# Patient Record
Sex: FEMALE | Race: BLACK OR AFRICAN AMERICAN | Employment: FULL TIME | URBAN - METROPOLITAN AREA
[De-identification: names, ages, dates, MRNs, and addresses within clinical notes are randomized per-mention and may not be internally consistent; named-entity substitution may affect disease eponyms.]

---

## 2017-12-08 ENCOUNTER — HOSPITAL ENCOUNTER (OUTPATIENT)
Dept: LAB | Age: 37
Discharge: HOME | End: 2017-12-08
Payer: COMMERCIAL

## 2017-12-08 LAB
ALBUMIN SERPL-MCNC: 2.8 G/DL (ref 3.4–5)
ALBUMIN/GLOB SERPL: 0.6 {RATIO} (ref 0.8–1.7)
ALP SERPL-CCNC: 82 U/L (ref 45–117)
ALT SERPL-CCNC: 20 U/L (ref 13–56)
ANION GAP SERPL CALC-SCNC: 6 MMOL/L (ref 3–18)
AST SERPL-CCNC: 13 U/L (ref 15–37)
BASOPHILS # BLD: 0 K/UL (ref 0–0.06)
BASOPHILS NFR BLD: 0 % (ref 0–2)
BILIRUB SERPL-MCNC: 0.3 MG/DL (ref 0.2–1)
BUN SERPL-MCNC: 15 MG/DL (ref 7–18)
BUN/CREAT SERPL: 27 (ref 12–20)
CALCIUM SERPL-MCNC: 8.4 MG/DL (ref 8.5–10.1)
CHLORIDE SERPL-SCNC: 107 MMOL/L (ref 100–108)
CHOLEST SERPL-MCNC: 126 MG/DL
CO2 SERPL-SCNC: 27 MMOL/L (ref 21–32)
CREAT SERPL-MCNC: 0.56 MG/DL (ref 0.6–1.3)
DIFFERENTIAL METHOD BLD: ABNORMAL
EOSINOPHIL # BLD: 0.2 K/UL (ref 0–0.4)
EOSINOPHIL NFR BLD: 3 % (ref 0–5)
ERYTHROCYTE [DISTWIDTH] IN BLOOD BY AUTOMATED COUNT: 14.2 % (ref 11.6–14.5)
GLOBULIN SER CALC-MCNC: 4.9 G/DL (ref 2–4)
GLUCOSE SERPL-MCNC: 83 MG/DL (ref 74–99)
HCT VFR BLD AUTO: 36.7 % (ref 35–45)
HDLC SERPL-MCNC: 47 MG/DL (ref 40–60)
HDLC SERPL: 2.7 {RATIO} (ref 0–5)
HGB BLD-MCNC: 11.9 G/DL (ref 12–16)
LDLC SERPL CALC-MCNC: 72.2 MG/DL (ref 0–100)
LIPID PROFILE,FLP: NORMAL
LYMPHOCYTES # BLD: 1.2 K/UL (ref 0.9–3.6)
LYMPHOCYTES NFR BLD: 16 % (ref 21–52)
MCH RBC QN AUTO: 27.2 PG (ref 24–34)
MCHC RBC AUTO-ENTMCNC: 32.4 G/DL (ref 31–37)
MCV RBC AUTO: 83.8 FL (ref 74–97)
MONOCYTES # BLD: 0.7 K/UL (ref 0.05–1.2)
MONOCYTES NFR BLD: 9 % (ref 3–10)
NEUTS SEG # BLD: 5.8 K/UL (ref 1.8–8)
NEUTS SEG NFR BLD: 72 % (ref 40–73)
PLATELET # BLD AUTO: 318 K/UL (ref 135–420)
PMV BLD AUTO: 9.7 FL (ref 9.2–11.8)
POTASSIUM SERPL-SCNC: 4.3 MMOL/L (ref 3.5–5.5)
PROT SERPL-MCNC: 7.7 G/DL (ref 6.4–8.2)
RBC # BLD AUTO: 4.38 M/UL (ref 4.2–5.3)
SODIUM SERPL-SCNC: 140 MMOL/L (ref 136–145)
TRIGL SERPL-MCNC: 34 MG/DL (ref ?–150)
VLDLC SERPL CALC-MCNC: 6.8 MG/DL
WBC # BLD AUTO: 7.9 K/UL (ref 4.6–13.2)

## 2017-12-08 PROCEDURE — 80061 LIPID PANEL: CPT | Performed by: INTERNAL MEDICINE

## 2017-12-08 PROCEDURE — 36415 COLL VENOUS BLD VENIPUNCTURE: CPT | Performed by: INTERNAL MEDICINE

## 2017-12-08 PROCEDURE — 80053 COMPREHEN METABOLIC PANEL: CPT | Performed by: INTERNAL MEDICINE

## 2017-12-08 PROCEDURE — 85025 COMPLETE CBC W/AUTO DIFF WBC: CPT | Performed by: INTERNAL MEDICINE

## 2018-10-20 ENCOUNTER — APPOINTMENT (OUTPATIENT)
Dept: GENERAL RADIOLOGY | Age: 38
End: 2018-10-20
Attending: PHYSICIAN ASSISTANT
Payer: COMMERCIAL

## 2018-10-20 ENCOUNTER — HOSPITAL ENCOUNTER (EMERGENCY)
Age: 38
Discharge: HOME | End: 2018-10-20
Attending: EMERGENCY MEDICINE
Payer: COMMERCIAL

## 2018-10-20 VITALS
HEART RATE: 77 BPM | TEMPERATURE: 98.2 F | HEIGHT: 69 IN | WEIGHT: 293 LBS | DIASTOLIC BLOOD PRESSURE: 69 MMHG | RESPIRATION RATE: 18 BRPM | BODY MASS INDEX: 43.4 KG/M2 | SYSTOLIC BLOOD PRESSURE: 115 MMHG | OXYGEN SATURATION: 100 %

## 2018-10-20 DIAGNOSIS — E87.6 HYPOKALEMIA: ICD-10-CM

## 2018-10-20 DIAGNOSIS — R10.13 DYSPEPSIA: ICD-10-CM

## 2018-10-20 DIAGNOSIS — K21.9 GASTROESOPHAGEAL REFLUX DISEASE, ESOPHAGITIS PRESENCE NOT SPECIFIED: Primary | ICD-10-CM

## 2018-10-20 LAB
ALBUMIN SERPL-MCNC: 3.1 G/DL (ref 3.4–5)
ALBUMIN/GLOB SERPL: 0.5 {RATIO} (ref 0.8–1.7)
ALP SERPL-CCNC: 84 U/L (ref 45–117)
ALT SERPL-CCNC: 24 U/L (ref 13–56)
ANION GAP SERPL CALC-SCNC: 11 MMOL/L (ref 3–18)
APPEARANCE UR: CLEAR
AST SERPL-CCNC: 18 U/L (ref 15–37)
BACTERIA URNS QL MICRO: ABNORMAL /HPF
BASOPHILS # BLD: 0 K/UL (ref 0–0.1)
BASOPHILS NFR BLD: 0 % (ref 0–2)
BILIRUB SERPL-MCNC: 0.3 MG/DL (ref 0.2–1)
BILIRUB UR QL: NEGATIVE
BUN SERPL-MCNC: 12 MG/DL (ref 7–18)
BUN/CREAT SERPL: 16 (ref 12–20)
CALCIUM SERPL-MCNC: 8.7 MG/DL (ref 8.5–10.1)
CHLORIDE SERPL-SCNC: 103 MMOL/L (ref 100–108)
CK MB CFR SERPL CALC: NORMAL % (ref 0–4)
CK MB SERPL-MCNC: <1 NG/ML (ref 5–25)
CK SERPL-CCNC: 62 U/L (ref 26–192)
CO2 SERPL-SCNC: 25 MMOL/L (ref 21–32)
COLOR UR: YELLOW
CREAT SERPL-MCNC: 0.76 MG/DL (ref 0.6–1.3)
DIFFERENTIAL METHOD BLD: ABNORMAL
EOSINOPHIL # BLD: 0.1 K/UL (ref 0–0.4)
EOSINOPHIL NFR BLD: 1 % (ref 0–5)
EPITH CASTS URNS QL MICRO: ABNORMAL /LPF (ref 0–5)
ERYTHROCYTE [DISTWIDTH] IN BLOOD BY AUTOMATED COUNT: 14.2 % (ref 11.6–14.5)
GLOBULIN SER CALC-MCNC: 5.8 G/DL (ref 2–4)
GLUCOSE SERPL-MCNC: 97 MG/DL (ref 74–99)
GLUCOSE UR STRIP.AUTO-MCNC: NEGATIVE MG/DL
HCG UR QL: NEGATIVE
HCT VFR BLD AUTO: 39.8 % (ref 35–45)
HGB BLD-MCNC: 12.8 G/DL (ref 12–16)
HGB UR QL STRIP: NEGATIVE
KETONES UR QL STRIP.AUTO: NEGATIVE MG/DL
LEUKOCYTE ESTERASE UR QL STRIP.AUTO: ABNORMAL
LIPASE SERPL-CCNC: 101 U/L (ref 73–393)
LYMPHOCYTES # BLD: 1.9 K/UL (ref 0.9–3.6)
LYMPHOCYTES NFR BLD: 23 % (ref 21–52)
MCH RBC QN AUTO: 26.7 PG (ref 24–34)
MCHC RBC AUTO-ENTMCNC: 32.2 G/DL (ref 31–37)
MCV RBC AUTO: 82.9 FL (ref 74–97)
MONOCYTES # BLD: 0.7 K/UL (ref 0.05–1.2)
MONOCYTES NFR BLD: 9 % (ref 3–10)
MUCOUS THREADS URNS QL MICRO: ABNORMAL /LPF
NEUTS SEG # BLD: 5.5 K/UL (ref 1.8–8)
NEUTS SEG NFR BLD: 67 % (ref 40–73)
NITRITE UR QL STRIP.AUTO: NEGATIVE
PH UR STRIP: 5.5 [PH] (ref 5–8)
PLATELET # BLD AUTO: 431 K/UL (ref 135–420)
PMV BLD AUTO: 9.7 FL (ref 9.2–11.8)
POTASSIUM SERPL-SCNC: 3.1 MMOL/L (ref 3.5–5.5)
PROT SERPL-MCNC: 8.9 G/DL (ref 6.4–8.2)
PROT UR STRIP-MCNC: NEGATIVE MG/DL
RBC # BLD AUTO: 4.8 M/UL (ref 4.2–5.3)
RBC #/AREA URNS HPF: ABNORMAL /HPF (ref 0–5)
SODIUM SERPL-SCNC: 139 MMOL/L (ref 136–145)
SP GR UR REFRACTOMETRY: 1.02 (ref 1–1.03)
TROPONIN I SERPL-MCNC: <0.02 NG/ML (ref 0–0.06)
TROPONIN I SERPL-MCNC: <0.02 NG/ML (ref 0–0.06)
UROBILINOGEN UR QL STRIP.AUTO: 1 EU/DL (ref 0.2–1)
WBC # BLD AUTO: 8.2 K/UL (ref 4.6–13.2)
WBC URNS QL MICRO: ABNORMAL /HPF (ref 0–4)

## 2018-10-20 PROCEDURE — 96376 TX/PRO/DX INJ SAME DRUG ADON: CPT

## 2018-10-20 PROCEDURE — 80053 COMPREHEN METABOLIC PANEL: CPT | Performed by: PHYSICIAN ASSISTANT

## 2018-10-20 PROCEDURE — 71046 X-RAY EXAM CHEST 2 VIEWS: CPT

## 2018-10-20 PROCEDURE — 93005 ELECTROCARDIOGRAM TRACING: CPT

## 2018-10-20 PROCEDURE — 96375 TX/PRO/DX INJ NEW DRUG ADDON: CPT

## 2018-10-20 PROCEDURE — 81025 URINE PREGNANCY TEST: CPT | Performed by: PHYSICIAN ASSISTANT

## 2018-10-20 PROCEDURE — 99284 EMERGENCY DEPT VISIT MOD MDM: CPT

## 2018-10-20 PROCEDURE — 74011250636 HC RX REV CODE- 250/636: Performed by: PHYSICIAN ASSISTANT

## 2018-10-20 PROCEDURE — 85025 COMPLETE CBC W/AUTO DIFF WBC: CPT | Performed by: PHYSICIAN ASSISTANT

## 2018-10-20 PROCEDURE — 96374 THER/PROPH/DIAG INJ IV PUSH: CPT

## 2018-10-20 PROCEDURE — 81001 URINALYSIS AUTO W/SCOPE: CPT | Performed by: PHYSICIAN ASSISTANT

## 2018-10-20 PROCEDURE — 82550 ASSAY OF CK (CPK): CPT | Performed by: PHYSICIAN ASSISTANT

## 2018-10-20 PROCEDURE — 83690 ASSAY OF LIPASE: CPT | Performed by: PHYSICIAN ASSISTANT

## 2018-10-20 RX ORDER — ONDANSETRON HYDROCHLORIDE 4 MG/2ML
4 INJECTION, SOLUTION INTRAMUSCULAR; INTRAVENOUS
Status: COMPLETED | OUTPATIENT
Start: 2018-10-20 | End: 2018-10-20

## 2018-10-20 RX ORDER — POTASSIUM CHLORIDE 1500 MG/1
20 TABLET, FILM COATED, EXTENDED RELEASE ORAL 2 TIMES DAILY
Qty: 6 TAB | Refills: 0 | Status: SHIPPED | OUTPATIENT
Start: 2018-10-20 | End: 2021-08-09

## 2018-10-20 RX ORDER — FAMOTIDINE 10 MG/ML
20 INJECTION INTRAVENOUS
Status: COMPLETED | OUTPATIENT
Start: 2018-10-20 | End: 2018-10-20

## 2018-10-20 RX ORDER — FAMOTIDINE 20 MG/1
20 TABLET, FILM COATED ORAL 2 TIMES DAILY
Qty: 20 TAB | Refills: 0 | Status: SHIPPED | OUTPATIENT
Start: 2018-10-20 | End: 2018-10-30

## 2018-10-20 RX ADMIN — FAMOTIDINE 20 MG: 10 INJECTION, SOLUTION INTRAVENOUS at 13:21

## 2018-10-20 RX ADMIN — ONDANSETRON HYDROCHLORIDE 4 MG: 4 INJECTION, SOLUTION INTRAMUSCULAR; INTRAVENOUS at 13:21

## 2018-10-20 RX ADMIN — FAMOTIDINE 20 MG: 10 INJECTION, SOLUTION INTRAVENOUS at 17:00

## 2018-10-20 NOTE — ED TRIAGE NOTES
Pt reports onset of chest pain on Tuesday; states left arm pain since Wednesday. States h/o acid reflux but had misplaced her medication x 1 week but has since relocated med (Protonix) & restarted it. Also took Zantac yesterday. Pt states she has been experiencing increased belching & flatulence lately.

## 2018-10-20 NOTE — ED PROVIDER NOTES
EMERGENCY DEPARTMENT HISTORY AND PHYSICAL EXAM 
 
Date: 10/20/2018 Patient Name: Nhung Chaparro History of Presenting Illness Chief Complaint Patient presents with  Chest Pain  Arm Pain History Provided By: patient Chief Complaint: chest pain Duration: few days Timing:acute Location: mid sternal 
Quality burning Severity: moderate Modifying Factors: none Associated Symptoms: epigastric burning, reflux, belching, L shoulder pain Additional History (Context): Nhung Chaparro is a 41 y.o. female with PMH GERD, fatty liver, htn, depression, and gallstones who presents with complaints of belching, epigastric burning, chest pain, reflux, and now L shoulder pain x a few days. Pt states she ran out of her reflux meds which she believed triggered her sx. States she just recently started this medication again. Denies fever, chills, and SOB. No other complaints. PCP: Brigid Olivia MD 
 
Current Outpatient Medications Medication Sig Dispense Refill  meloxicam (MOBIC PO) Take  by mouth daily as needed.  pantoprazole (PROTONIX) 40 mg tablet Take 1 Tab by mouth daily. 30 Tab 1  
 medroxyPROGESTERone (DEPO-PROVERA) 150 mg/mL injection 150 mg by IntraMUSCular route once.  sertraline (ZOLOFT) 50 mg tablet Take 50 mg by mouth daily. Past History Past Medical History: 
Past Medical History:  
Diagnosis Date  Anxiety  Anxiety  Depression  Elevated transaminase level 2016  Fatty infiltration of liver 2016  Gallstones  Gastroesophageal reflux disease without esophagitis 2016  GERD (gastroesophageal reflux disease)  HTN (hypertension) 2016  
 pt denies  Morbid obesity due to excess calories ([address] 2016 Past Surgical History: 
Past Surgical History:  
Procedure Laterality Date   DELIVERY ONLY    
 LAP,CHOLECYSTECTOMY  16 Dr. Sloane Saldana Family History: History reviewed. No pertinent family history. Social History: 
Social History Tobacco Use  Smoking status: Never Smoker  Smokeless tobacco: Never Used Substance Use Topics  Alcohol use: No  
 Drug use: No  
 
 
Allergies: Allergies Allergen Reactions  Other Food Hives and Swelling [address]  Bactrim [Sulfamethoprim Ds] Hives  Penicillin G Nausea and Vomiting Review of Systems Review of Systems Constitutional: Negative. Negative for chills and fever. HENT: Negative for congestion, ear pain and rhinorrhea. Eyes: Negative. Negative for pain and redness. Respiratory: Negative. Negative for cough, shortness of breath, wheezing and stridor. Cardiovascular: Positive for chest pain. Negative for leg swelling. Gastrointestinal: Positive for abdominal pain. Negative for constipation, diarrhea, nausea and vomiting. Belching, reflux Genitourinary: Negative. Negative for dysuria and frequency. Musculoskeletal: Negative. Negative for back pain and neck pain. Skin: Negative. Negative for rash and wound. Neurological: Negative. Negative for dizziness, seizures, syncope and headaches. All other systems reviewed and are negative. All Other Systems Negative Physical Exam  
 
Vitals:  
 10/20/18 1214 10/20/18 1324 10/20/18 1324 10/20/18 1515 BP: 144/85  101/71 126/78 Pulse: (!) 101 88  78 Resp: 16 15  18 Temp: 98.3 °F (36.8 °C) SpO2: 100% 99%  100% Weight: 149.7 kg (330 lb) Height: 5' 9\" (1.753 m) Physical Exam  
Constitutional: She is oriented to person, place, and time. She appears well-developed and well-nourished. No distress. HENT:  
Head: Normocephalic and atraumatic. Eyes: Conjunctivae are normal. Right eye exhibits no discharge. Left eye exhibits no discharge. No scleral icterus. Neck: Normal range of motion. Neck supple. Cardiovascular: Normal rate, regular rhythm and normal heart sounds.  Exam reveals no gallop and no friction rub. No murmur heard. Pulmonary/Chest: Effort normal and breath sounds normal. No stridor. No respiratory distress. She has no wheezes. She has no rales. She exhibits no tenderness. Abdominal: Soft. Bowel sounds are normal. She exhibits no distension. There is no tenderness. There is no guarding. Pt belching repeatedly during the exam.   
Musculoskeletal: Normal range of motion. Neurological: She is alert and oriented to person, place, and time. Coordination normal.  
Gait is steady. Able to ambulate without difficulty. Skin: Skin is warm and dry. No rash noted. She is not diaphoretic. No erythema. Psychiatric: She has a normal mood and affect. Her behavior is normal. Thought content normal.  
Nursing note and vitals reviewed. Diagnostic Study Results Labs - Recent Results (from the past 12 hour(s)) EKG, 12 LEAD, INITIAL Collection Time: 10/20/18 12:02 PM  
Result Value Ref Range Ventricular Rate 103 BPM  
 Atrial Rate 103 BPM  
 P-R Interval 168 ms QRS Duration 100 ms Q-T Interval 354 ms QTC Calculation (Bezet) 463 ms Calculated P Axis 55 degrees Calculated R Axis -23 degrees Calculated T Axis 20 degrees Diagnosis Sinus tachycardia Nonspecific ST and T wave abnormality Abnormal ECG When compared with ECG of 27-JUN-2016 09:30, 
T wave inversion now evident in Anterior leads CBC WITH AUTOMATED DIFF Collection Time: 10/20/18 12:07 PM  
Result Value Ref Range WBC 8.2 4.6 - 13.2 K/uL  
 RBC 4.80 4.20 - 5.30 M/uL  
 HGB 12.8 12.0 - 16.0 g/dL HCT 39.8 35.0 - 45.0 % MCV 82.9 74.0 - 97.0 FL  
 MCH 26.7 24.0 - 34.0 PG  
 MCHC 32.2 31.0 - 37.0 g/dL  
 RDW 14.2 11.6 - 14.5 % PLATELET 096 (H) 749 - 420 K/uL MPV 9.7 9.2 - 11.8 FL  
 NEUTROPHILS 67 40 - 73 % LYMPHOCYTES 23 21 - 52 % MONOCYTES 9 3 - 10 % EOSINOPHILS 1 0 - 5 % BASOPHILS 0 0 - 2 %  
 ABS. NEUTROPHILS 5.5 1.8 - 8.0 K/UL ABS. LYMPHOCYTES 1.9 0.9 - 3.6 K/UL  
 ABS. MONOCYTES 0.7 0.05 - 1.2 K/UL  
 ABS. EOSINOPHILS 0.1 0.0 - 0.4 K/UL  
 ABS. BASOPHILS 0.0 0.0 - 0.1 K/UL  
 DF AUTOMATED METABOLIC PANEL, COMPREHENSIVE Collection Time: 10/20/18 12:07 PM  
Result Value Ref Range Sodium 139 136 - 145 mmol/L Potassium 3.1 (L) 3.5 - 5.5 mmol/L Chloride 103 100 - 108 mmol/L  
 CO2 25 21 - 32 mmol/L Anion gap 11 3.0 - 18 mmol/L Glucose 97 74 - 99 mg/dL BUN 12 7.0 - 18 MG/DL Creatinine 0.76 0.6 - 1.3 MG/DL  
 BUN/Creatinine ratio 16 12 - 20 GFR est AA >60 >60 ml/min/1.73m2 GFR est non-AA >60 >60 ml/min/1.73m2 Calcium 8.7 8.5 - 10.1 MG/DL Bilirubin, total 0.3 0.2 - 1.0 MG/DL  
 ALT (SGPT) 24 13 - 56 U/L  
 AST (SGOT) 18 15 - 37 U/L Alk. phosphatase 84 45 - 117 U/L Protein, total 8.9 (H) 6.4 - 8.2 g/dL Albumin 3.1 (L) 3.4 - 5.0 g/dL Globulin 5.8 (H) 2.0 - 4.0 g/dL A-G Ratio 0.5 (L) 0.8 - 1.7 LIPASE Collection Time: 10/20/18 12:07 PM  
Result Value Ref Range Lipase 101 73 - 393 U/L  
CARDIAC PANEL,(CK, CKMB & TROPONIN) Collection Time: 10/20/18 12:07 PM  
Result Value Ref Range CK 62 26 - 192 U/L  
 CK - MB <1.0 <3.6 ng/ml CK-MB Index  0.0 - 4.0 % CALCULATION NOT PERFORMED WHEN RESULT IS BELOW LINEAR LIMIT Troponin-I, Qt. <0.02 0.00 - 0.06 NG/ML  
URINALYSIS W/ RFLX MICROSCOPIC Collection Time: 10/20/18 12:57 PM  
Result Value Ref Range Color YELLOW Appearance CLEAR Specific gravity 1.024 1.005 - 1.030    
 pH (UA) 5.5 5.0 - 8.0 Protein NEGATIVE  NEG mg/dL Glucose NEGATIVE  NEG mg/dL Ketone NEGATIVE  NEG mg/dL Bilirubin NEGATIVE  NEG Blood NEGATIVE  NEG Urobilinogen 1.0 0.2 - 1.0 EU/dL Nitrites NEGATIVE  NEG Leukocyte Esterase TRACE (A) NEG    
HCG URINE, QL Collection Time: 10/20/18 12:57 PM  
Result Value Ref Range HCG urine, QL NEGATIVE  NEG    
URINE MICROSCOPIC ONLY  Collection Time: 10/20/18 12:57 PM  
 Result Value Ref Range WBC 0 to 3 0 - 4 /hpf  
 RBC NONE 0 - 5 /hpf Epithelial cells 3+ 0 - 5 /lpf Bacteria 1+ (A) NEG /hpf Mucus 2+ (A) NEG /lpf  
TROPONIN I Collection Time: 10/20/18  3:55 PM  
Result Value Ref Range Troponin-I, Qt. <0.02 0.00 - 0.06 NG/ML Radiologic Studies -  
XR CHEST PA LAT Final Result  
  
no acute process or definite infiltrates noted CT Results  (Last 48 hours) None CXR Results  (Last 48 hours) 10/20/18 1245  XR CHEST PA LAT Final result Impression:  IMPRESSION:  
   
Negative study. Narrative:  EXAM:  Chest Frontal and Lateral.  
   
INDICATION:  Chest pain COMPARISON:  11/12/11 TECHNIQUE:  PA and lateral views. FINDINGS:   
   
- Both lungs are clear.   
- No pleural effusion or pneumothorax is detected. - The cardiac silhouette, mediastinum and hilar regions are unremarkable. - No significant interval changes are identified. Medical Decision Making I am the first provider for this patient. I reviewed the vital signs, available nursing notes, past medical history, past surgical history, family history and social history. Vital Signs-Reviewed the patient's vital signs. Records Reviewed: Rosario Granger PA-C 1:34 PM  
 
Procedures: 
Procedures Provider Notes (Medical Decision Making): Impression:  GERD, dyspepsia Repeat cardiac enzymes and EKG unchanged. pepcid and zofran alleviated the sx. Pt counseled about dietary modifications to include eliminating caffeinated beverages, as well as spicy and acidic foods. MED RECONCILIATION: 
No current facility-administered medications for this encounter. Current Outpatient Medications Medication Sig  
 meloxicam (MOBIC PO) Take  by mouth daily as needed.  pantoprazole (PROTONIX) 40 mg tablet Take 1 Tab by mouth daily.   
 medroxyPROGESTERone (DEPO-PROVERA) 150 mg/mL injection 150 mg by IntraMUSCular route once.  sertraline (ZOLOFT) 50 mg tablet Take 50 mg by mouth daily. Disposition: D/c 
 
DISCHARGE NOTE:  
 
Pt has been reexamined. Patient has no new complaints, changes, or physical findings. Care plan outlined and precautions discussed. Results of the labs and imaging were reviewed with the patient. All medications were reviewed with the patient; will d/c home with pepcid. All of pt's questions and concerns were addressed. Patient was instructed and agrees to follow up with PCP, as well as to return to the ED upon further deterioration. Patient is ready to go home. Rosario Granger PA-C 4:46 PM  
 
 
Diagnosis Clinical Impression:dyspepsia, chest pain unspec

## 2018-10-20 NOTE — ED NOTES
I have reviewed discharge instructions with the patient. The patient verbalized understanding. Patient armband removed and shredded Current Discharge Medication List  
  
START taking these medications Details  
famotidine (PEPCID) 20 mg tablet Take 1 Tab by mouth two (2) times a day for 10 days. Qty: 20 Tab, Refills: 0  
  
potassium chloride SR (K-TAB) 20 mEq tablet Take 1 Tab by mouth two (2) times a day. Qty: 6 Tab, Refills: 0 STOP taking these medications  
  
 pantoprazole (PROTONIX) 40 mg tablet Comments:  
Reason for Stopping:

## 2018-10-20 NOTE — DISCHARGE INSTRUCTIONS
Indigestion (Dyspepsia or Heartburn): Care Instructions  Your Care Instructions  Sometimes it can be hard to pinpoint the cause of indigestion. (It is also called dyspepsia or heartburn.) Most cases of an upset stomach with bloating, burning, burping, and nausea are minor and go away within several hours. Home treatment and over-the-counter medicine often are able to control symptoms. But if you take medicine to relieve your indigestion without making diet and lifestyle changes, your symptoms are likely to return again and again. If you get indigestion often, it may be a sign of a more serious medical problem. Be sure to follow up with your doctor, who may want to do tests to be sure of the cause of your indigestion. Follow-up care is a key part of your treatment and safety. Be sure to make and go to all appointments, and call your doctor if you are having problems. It's also a good idea to know your test results and keep a list of the medicines you take. How can you care for yourself at home? · Your doctor may recommend over-the-counter medicine. For mild or occasional indigestion, antacids such as Gaviscon, Mylanta, Maalox, or Tums, may help. Be safe with medicines. Be careful when you take over-the-counter antacid medicines. Many of these medicines have aspirin in them. Read the label to make sure that you are not taking more than the recommended dose. Too much aspirin can be harmful. · Your doctor also may recommend over-the-counter acid reducers, such as Pepcid AC, Tagamet HB, Zantac 75, or Prilosec. Read and follow all instructions on the label. If you use these medicines often, talk with your doctor. · Change your eating habits. ? It's best to eat several small meals instead of two or three large meals. ? After you eat, wait 2 to 3 hours before you lie down. ? Chocolate, mint, and alcohol can make GERD worse. ?  Spicy foods, foods that have a lot of acid (like tomatoes and oranges), and coffee can make GERD symptoms worse in some people. If your symptoms are worse after you eat a certain food, you may want to stop eating that food to see if your symptoms get better. · Do not smoke or chew tobacco. Smoking can make GERD worse. If you need help quitting, talk to your doctor about stop-smoking programs and medicines. These can increase your chances of quitting for good. · If you have GERD symptoms at night, raise the head of your bed 6 to 8 inches. You can do this by putting the frame on blocks or placing a foam wedge under the head of your mattress. (Adding extra pillows does not work.)  · Do not wear tight clothing around your middle. · Lose weight if you need to. Losing just 5 to 10 pounds can help. · Do not take anti-inflammatory medicines, such as aspirin, ibuprofen (Advil, Motrin), or naproxen (Aleve). These can irritate the stomach. If you need a pain medicine, try acetaminophen (Tylenol), which does not cause stomach upset. When should you call for help? Call your doctor now or seek immediate medical care if:    · You have new or worse belly pain.     · You are vomiting.    Watch closely for changes in your health, and be sure to contact your doctor if:    · You have new or worse symptoms of indigestion.     · You have trouble or pain swallowing.     · You are losing weight.     · You do not get better as expected. Where can you learn more? Go to http://marianela-franklin.info/. Enter B423 in the search box to learn more about \"Indigestion (Dyspepsia or Heartburn): Care Instructions. \"  Current as of: March 28, 2018  Content Version: 11.8  © 6199-8277 [employer]. Care instructions adapted under license by [employer] (which disclaims liability or warranty for this information).  If you have questions about a medical condition or this instruction, always ask your healthcare professional. [address] any warranty or liability for your use of this information. MyChart Activation    Thank you for requesting access to [facility]. Please follow the instructions below to securely access and download your online medical record. [facility] allows you to send messages to your doctor, view your test results, renew your prescriptions, schedule appointments, and more. How Do I Sign Up? 1. In your internet browser, go to [email or web address]  2. Click on the First Time User? Click Here link in the Sign In box. You will be redirect to the New Member Sign Up page. 3. Enter your [facility] Access Code exactly as it appears below. You will not need to use this code after youve completed the sign-up process. If you do not sign up before the expiration date, you must request a new code. [facility] Access Code: [email or web address]  Expires: 2019 11:58 AM (This is the date your [facility] access code will )    4. Enter the last four digits of your Social Security Number (xxxx) and Date of Birth (mm/dd/yyyy) as indicated and click Submit. You will be taken to the next sign-up page. 5. Create a [facility] ID. This will be your [facility] login ID and cannot be changed, so think of one that is secure and easy to remember. 6. Create a [facility] password. You can change your password at any time. 7. Enter your Password Reset Question and Answer. This can be used at a later time if you forget your password. 8. Enter your e-mail address. You will receive e-mail notification when new information is available in [address]. 9. Click Sign Up. You can now view and download portions of your medical record. 10. Click the Download Summary menu link to download a portable copy of your medical information. Additional Information    If you have questions, please visit the Frequently Asked Questions section of the [facility] website at [email or web address] Remember, [facility] is NOT to be used for urgent needs. For medical emergencies, dial 911.

## 2018-10-21 LAB
ATRIAL RATE: 103 BPM
ATRIAL RATE: 75 BPM
CALCULATED P AXIS, ECG09: 55 DEGREES
CALCULATED P AXIS, ECG09: 64 DEGREES
CALCULATED R AXIS, ECG10: -23 DEGREES
CALCULATED R AXIS, ECG10: 25 DEGREES
CALCULATED T AXIS, ECG11: 20 DEGREES
CALCULATED T AXIS, ECG11: 49 DEGREES
DIAGNOSIS, 93000: NORMAL
DIAGNOSIS, 93000: NORMAL
P-R INTERVAL, ECG05: 162 MS
P-R INTERVAL, ECG05: 168 MS
Q-T INTERVAL, ECG07: 354 MS
Q-T INTERVAL, ECG07: 398 MS
QRS DURATION, ECG06: 100 MS
QRS DURATION, ECG06: 102 MS
QTC CALCULATION (BEZET), ECG08: 444 MS
QTC CALCULATION (BEZET), ECG08: 463 MS
VENTRICULAR RATE, ECG03: 103 BPM
VENTRICULAR RATE, ECG03: 75 BPM

## 2019-11-23 ENCOUNTER — HOSPITAL ENCOUNTER (OUTPATIENT)
Dept: LAB | Age: 39
Discharge: HOME | End: 2019-11-23
Attending: INTERNAL MEDICINE
Payer: COMMERCIAL

## 2019-11-23 LAB
ALBUMIN SERPL-MCNC: 3 G/DL (ref 3.4–5)
ALBUMIN/GLOB SERPL: 0.6 {RATIO} (ref 0.8–1.7)
ALP SERPL-CCNC: 86 U/L (ref 45–117)
ALT SERPL-CCNC: 16 U/L (ref 13–56)
ANION GAP SERPL CALC-SCNC: 5 MMOL/L (ref 3–18)
AST SERPL-CCNC: 10 U/L (ref 10–38)
BASOPHILS # BLD: 0 K/UL (ref 0–0.1)
BASOPHILS NFR BLD: 0 % (ref 0–2)
BILIRUB SERPL-MCNC: 0.3 MG/DL (ref 0.2–1)
BUN SERPL-MCNC: 10 MG/DL (ref 7–18)
BUN/CREAT SERPL: 17 (ref 12–20)
CALCIUM SERPL-MCNC: 8.5 MG/DL (ref 8.5–10.1)
CHLORIDE SERPL-SCNC: 109 MMOL/L (ref 100–111)
CHOLEST SERPL-MCNC: 145 MG/DL
CO2 SERPL-SCNC: 28 MMOL/L (ref 21–32)
CREAT SERPL-MCNC: 0.6 MG/DL (ref 0.6–1.3)
DIFFERENTIAL METHOD BLD: ABNORMAL
EOSINOPHIL # BLD: 0.1 K/UL (ref 0–0.4)
EOSINOPHIL NFR BLD: 1 % (ref 0–5)
ERYTHROCYTE [DISTWIDTH] IN BLOOD BY AUTOMATED COUNT: 14.3 % (ref 11.6–14.5)
GLOBULIN SER CALC-MCNC: 4.8 G/DL (ref 2–4)
GLUCOSE SERPL-MCNC: 80 MG/DL (ref 74–99)
HCT VFR BLD AUTO: 37.3 % (ref 35–45)
HDLC SERPL-MCNC: 43 MG/DL (ref 40–60)
HDLC SERPL: 3.4 {RATIO} (ref 0–5)
HGB BLD-MCNC: 12.2 G/DL (ref 12–16)
LDLC SERPL CALC-MCNC: 92 MG/DL (ref 0–100)
LIPID PROFILE,FLP: NORMAL
LYMPHOCYTES # BLD: 1.1 K/UL (ref 0.9–3.6)
LYMPHOCYTES NFR BLD: 19 % (ref 21–52)
MCH RBC QN AUTO: 27 PG (ref 24–34)
MCHC RBC AUTO-ENTMCNC: 32.7 G/DL (ref 31–37)
MCV RBC AUTO: 82.5 FL (ref 74–97)
MONOCYTES # BLD: 0.5 K/UL (ref 0.05–1.2)
MONOCYTES NFR BLD: 8 % (ref 3–10)
NEUTS SEG # BLD: 4.2 K/UL (ref 1.8–8)
NEUTS SEG NFR BLD: 72 % (ref 40–73)
PLATELET # BLD AUTO: 360 K/UL (ref 135–420)
PMV BLD AUTO: 9.6 FL (ref 9.2–11.8)
POTASSIUM SERPL-SCNC: 4.2 MMOL/L (ref 3.5–5.5)
PROT SERPL-MCNC: 7.8 G/DL (ref 6.4–8.2)
RBC # BLD AUTO: 4.52 M/UL (ref 4.2–5.3)
SODIUM SERPL-SCNC: 142 MMOL/L (ref 136–145)
TRIGL SERPL-MCNC: 50 MG/DL (ref ?–150)
VLDLC SERPL CALC-MCNC: 10 MG/DL
WBC # BLD AUTO: 5.9 K/UL (ref 4.6–13.2)

## 2019-11-23 PROCEDURE — 36415 COLL VENOUS BLD VENIPUNCTURE: CPT

## 2019-11-23 PROCEDURE — 84436 ASSAY OF TOTAL THYROXINE: CPT

## 2019-11-23 PROCEDURE — 80053 COMPREHEN METABOLIC PANEL: CPT

## 2019-11-23 PROCEDURE — 80061 LIPID PANEL: CPT

## 2019-11-23 PROCEDURE — 85025 COMPLETE CBC W/AUTO DIFF WBC: CPT

## 2019-11-24 LAB
T4 SERPL-MCNC: 7.6 UG/DL (ref 4.8–13.9)
TSH SERPL DL<=0.05 MIU/L-ACNC: 0.98 UIU/ML (ref 0.36–3.74)

## 2020-12-14 ENCOUNTER — HOSPITAL ENCOUNTER (OUTPATIENT)
Dept: LAB | Age: 40
Discharge: HOME | End: 2020-12-14
Payer: COMMERCIAL

## 2020-12-14 LAB
ALBUMIN SERPL-MCNC: 3 G/DL (ref 3.4–5)
ALBUMIN/GLOB SERPL: 0.6 {RATIO} (ref 0.8–1.7)
ALP SERPL-CCNC: 94 U/L (ref 45–117)
ALT SERPL-CCNC: 15 U/L (ref 13–56)
ANION GAP SERPL CALC-SCNC: 5 MMOL/L (ref 3–18)
AST SERPL-CCNC: 7 U/L (ref 10–38)
BASOPHILS # BLD: 0 K/UL (ref 0–0.1)
BASOPHILS NFR BLD: 0 % (ref 0–2)
BILIRUB SERPL-MCNC: 0.2 MG/DL (ref 0.2–1)
BUN SERPL-MCNC: 17 MG/DL (ref 7–18)
BUN/CREAT SERPL: 27 (ref 12–20)
CALCIUM SERPL-MCNC: 8.8 MG/DL (ref 8.5–10.1)
CHLORIDE SERPL-SCNC: 108 MMOL/L (ref 100–111)
CHOLEST SERPL-MCNC: 146 MG/DL
CO2 SERPL-SCNC: 27 MMOL/L (ref 21–32)
CREAT SERPL-MCNC: 0.63 MG/DL (ref 0.6–1.3)
DIFFERENTIAL METHOD BLD: ABNORMAL
EOSINOPHIL # BLD: 0.1 K/UL (ref 0–0.4)
EOSINOPHIL NFR BLD: 1 % (ref 0–5)
ERYTHROCYTE [DISTWIDTH] IN BLOOD BY AUTOMATED COUNT: 14.1 % (ref 11.6–14.5)
GLOBULIN SER CALC-MCNC: 4.9 G/DL (ref 2–4)
GLUCOSE SERPL-MCNC: 92 MG/DL (ref 74–99)
HCT VFR BLD AUTO: 38.3 % (ref 35–45)
HDLC SERPL-MCNC: 45 MG/DL (ref 40–60)
HDLC SERPL: 3.2 {RATIO} (ref 0–5)
HGB BLD-MCNC: 12.4 G/DL (ref 12–16)
IRON SATN MFR SERPL: 10 % (ref 20–50)
IRON SERPL-MCNC: 25 UG/DL (ref 50–175)
LDLC SERPL CALC-MCNC: 92.8 MG/DL (ref 0–100)
LIPID PROFILE,FLP: NORMAL
LYMPHOCYTES # BLD: 1.1 K/UL (ref 0.9–3.6)
LYMPHOCYTES NFR BLD: 13 % (ref 21–52)
MCH RBC QN AUTO: 27 PG (ref 24–34)
MCHC RBC AUTO-ENTMCNC: 32.4 G/DL (ref 31–37)
MCV RBC AUTO: 83.3 FL (ref 74–97)
MONOCYTES # BLD: 0.5 K/UL (ref 0.05–1.2)
MONOCYTES NFR BLD: 6 % (ref 3–10)
NEUTS SEG # BLD: 6.4 K/UL (ref 1.8–8)
NEUTS SEG NFR BLD: 80 % (ref 40–73)
PLATELET # BLD AUTO: 410 K/UL (ref 135–420)
PMV BLD AUTO: 9.5 FL (ref 9.2–11.8)
POTASSIUM SERPL-SCNC: 4.3 MMOL/L (ref 3.5–5.5)
PROT SERPL-MCNC: 7.9 G/DL (ref 6.4–8.2)
RBC # BLD AUTO: 4.6 M/UL (ref 4.2–5.3)
SODIUM SERPL-SCNC: 140 MMOL/L (ref 136–145)
T4 SERPL-MCNC: 11 UG/DL (ref 4.8–13.9)
TIBC SERPL-MCNC: 259 UG/DL (ref 250–450)
TRIGL SERPL-MCNC: 41 MG/DL (ref ?–150)
TSH SERPL DL<=0.05 MIU/L-ACNC: 0.83 UIU/ML (ref 0.36–3.74)
VLDLC SERPL CALC-MCNC: 8.2 MG/DL
WBC # BLD AUTO: 8 K/UL (ref 4.6–13.2)

## 2020-12-14 PROCEDURE — 85025 COMPLETE CBC W/AUTO DIFF WBC: CPT

## 2020-12-14 PROCEDURE — 80053 COMPREHEN METABOLIC PANEL: CPT

## 2020-12-14 PROCEDURE — 84443 ASSAY THYROID STIM HORMONE: CPT

## 2020-12-14 PROCEDURE — 84436 ASSAY OF TOTAL THYROXINE: CPT

## 2020-12-14 PROCEDURE — 36415 COLL VENOUS BLD VENIPUNCTURE: CPT

## 2020-12-14 PROCEDURE — 83540 ASSAY OF IRON: CPT

## 2020-12-14 PROCEDURE — 80061 LIPID PANEL: CPT

## 2021-08-09 ENCOUNTER — HOSPITAL ENCOUNTER (EMERGENCY)
Age: 41
Discharge: HOME | End: 2021-08-09
Attending: EMERGENCY MEDICINE
Payer: COMMERCIAL

## 2021-08-09 ENCOUNTER — APPOINTMENT (OUTPATIENT)
Dept: GENERAL RADIOLOGY | Age: 41
End: 2021-08-09
Attending: EMERGENCY MEDICINE
Payer: COMMERCIAL

## 2021-08-09 VITALS
WEIGHT: 293 LBS | SYSTOLIC BLOOD PRESSURE: 139 MMHG | BODY MASS INDEX: 50.21 KG/M2 | HEART RATE: 93 BPM | OXYGEN SATURATION: 100 % | DIASTOLIC BLOOD PRESSURE: 86 MMHG | RESPIRATION RATE: 16 BRPM | TEMPERATURE: 99 F

## 2021-08-09 DIAGNOSIS — R10.13 DYSPEPSIA: Primary | ICD-10-CM

## 2021-08-09 LAB
ALBUMIN SERPL-MCNC: 3 G/DL (ref 3.4–5)
ALBUMIN/GLOB SERPL: 0.5 {RATIO} (ref 0.8–1.7)
ALP SERPL-CCNC: 81 U/L (ref 45–117)
ALT SERPL-CCNC: 21 U/L (ref 13–56)
ANION GAP SERPL CALC-SCNC: 7 MMOL/L (ref 3–18)
AST SERPL-CCNC: 10 U/L (ref 10–38)
BASOPHILS # BLD: 0.1 K/UL (ref 0–0.1)
BASOPHILS NFR BLD: 1 % (ref 0–2)
BILIRUB SERPL-MCNC: 0.2 MG/DL (ref 0.2–1)
BUN SERPL-MCNC: 15 MG/DL (ref 7–18)
BUN/CREAT SERPL: 22 (ref 12–20)
CALCIUM SERPL-MCNC: 8.4 MG/DL (ref 8.5–10.1)
CHLORIDE SERPL-SCNC: 109 MMOL/L (ref 100–111)
CO2 SERPL-SCNC: 25 MMOL/L (ref 21–32)
CREAT SERPL-MCNC: 0.69 MG/DL (ref 0.6–1.3)
DIFFERENTIAL METHOD BLD: ABNORMAL
EOSINOPHIL # BLD: 0.1 K/UL (ref 0–0.4)
EOSINOPHIL NFR BLD: 1 % (ref 0–5)
ERYTHROCYTE [DISTWIDTH] IN BLOOD BY AUTOMATED COUNT: 13.9 % (ref 11.6–14.5)
GLOBULIN SER CALC-MCNC: 5.5 G/DL (ref 2–4)
GLUCOSE SERPL-MCNC: 91 MG/DL (ref 74–99)
HCG UR QL: NEGATIVE
HCT VFR BLD AUTO: 36.9 % (ref 35–45)
HGB BLD-MCNC: 11.8 G/DL (ref 12–16)
LYMPHOCYTES # BLD: 1.1 K/UL (ref 0.9–3.6)
LYMPHOCYTES NFR BLD: 10 % (ref 21–52)
MCH RBC QN AUTO: 26.3 PG (ref 24–34)
MCHC RBC AUTO-ENTMCNC: 32 G/DL (ref 31–37)
MCV RBC AUTO: 82.4 FL (ref 74–97)
MONOCYTES # BLD: 0.7 K/UL (ref 0.05–1.2)
MONOCYTES NFR BLD: 7 % (ref 3–10)
NEUTS SEG # BLD: 8.4 K/UL (ref 1.8–8)
NEUTS SEG NFR BLD: 81 % (ref 40–73)
PLATELET # BLD AUTO: 414 K/UL (ref 135–420)
PMV BLD AUTO: 9.6 FL (ref 9.2–11.8)
POTASSIUM SERPL-SCNC: 3.4 MMOL/L (ref 3.5–5.5)
PROT SERPL-MCNC: 8.5 G/DL (ref 6.4–8.2)
RBC # BLD AUTO: 4.48 M/UL (ref 4.2–5.3)
SODIUM SERPL-SCNC: 141 MMOL/L (ref 136–145)
TROPONIN I SERPL-MCNC: <0.02 NG/ML (ref 0–0.04)
WBC # BLD AUTO: 10.3 K/UL (ref 4.6–13.2)

## 2021-08-09 PROCEDURE — 81025 URINE PREGNANCY TEST: CPT

## 2021-08-09 PROCEDURE — 99284 EMERGENCY DEPT VISIT MOD MDM: CPT

## 2021-08-09 PROCEDURE — 74011250637 HC RX REV CODE- 250/637: Performed by: EMERGENCY MEDICINE

## 2021-08-09 PROCEDURE — 71046 X-RAY EXAM CHEST 2 VIEWS: CPT

## 2021-08-09 PROCEDURE — 93005 ELECTROCARDIOGRAM TRACING: CPT

## 2021-08-09 PROCEDURE — 80053 COMPREHEN METABOLIC PANEL: CPT

## 2021-08-09 PROCEDURE — 84484 ASSAY OF TROPONIN QUANT: CPT

## 2021-08-09 PROCEDURE — 85025 COMPLETE CBC W/AUTO DIFF WBC: CPT

## 2021-08-09 RX ORDER — PHENOL/SODIUM PHENOLATE
20 AEROSOL, SPRAY (ML) MUCOUS MEMBRANE DAILY
Qty: 20 TABLET | Refills: 0 | Status: SHIPPED | OUTPATIENT
Start: 2021-08-09 | End: 2021-08-29

## 2021-08-09 RX ORDER — PANTOPRAZOLE SODIUM 20 MG/1
20 TABLET, DELAYED RELEASE ORAL DAILY
COMMUNITY
End: 2021-08-09

## 2021-08-09 RX ADMIN — ALUMINUM HYDROXIDE AND MAGNESIUM HYDROXIDE 30 ML: 200; 200 SUSPENSION ORAL at 21:59

## 2021-08-10 LAB
ATRIAL RATE: 95 BPM
CALCULATED P AXIS, ECG09: 47 DEGREES
CALCULATED T AXIS, ECG11: 45 DEGREES
DIAGNOSIS, 93000: NORMAL
P-R INTERVAL, ECG05: 166 MS
Q-T INTERVAL, ECG07: 376 MS
QRS DURATION, ECG06: 96 MS
QTC CALCULATION (BEZET), ECG08: 472 MS
VENTRICULAR RATE, ECG03: 95 BPM

## 2021-08-10 NOTE — ROUTINE PROCESS
Ramana Smith is a 37 y.o. female that was discharged in stable. Pt was accompanied by daughter. Pt is not driving. The patients diagnosis, condition and treatment were explained to  patient and aftercare instructions were given. The patient verbalized understanding. Patient armband removed and shredded.

## 2021-08-10 NOTE — ED PROVIDER NOTES
EMERGENCY DEPARTMENT HISTORY AND PHYSICAL EXAM    9:03 PM  Date: (Not on file)  Patient Name: Dede Monroy    History of Presenting Illness     Chief Complaint   Patient presents with    Chest Pain        History Provided By: Patient    HPI: Dede Monroy is a 37 y.o. female with 3 of multiple medical problems as below including GERD and hypertension. Patient is presenting with chest tightness and belching that started prior to arrival.  Denies chest pain but feels tightness that gets relieved when every time she burps. No shortness of breath. No history of nausea or vomiting or abdominal pain. Symptoms are similar to previous episodes however they are usually resolved with Pepcid. She took Pepcid prior to coming in a did not help so she decided to come to the ER. No history of recent travel or VTE in the past or recent COVID-19 infection. Location:  Severity:  Timing/course: Onset/Duration:     PCP: Rebecca Mejia MD    Past History     Past Medical History:  Past Medical History:   Diagnosis Date    Anxiety     Anxiety     Depression     Elevated transaminase level 2016    Fatty infiltration of liver 2016    Gallstones     Gastroesophageal reflux disease without esophagitis 2016    GERD (gastroesophageal reflux disease)     HTN (hypertension) 2016    pt denies    Morbid obesity due to excess calories ([address] 2016       Past Surgical History:  Past Surgical History:   Procedure Laterality Date    [facility]      [facility],CHOLECYSTECTOMY  16    Dr. Chuy Wilkins       Family History:  History reviewed. No pertinent family history. Social History:  Social History     Tobacco Use    Smoking status: Never Smoker    Smokeless tobacco: Never Used   Substance Use Topics    Alcohol use: No    Drug use: No       Allergies:   Allergies   Allergen Reactions    Other Food Hives and Swelling     [place] Seasoning    Bactrim [Sulfamethoprim Ds] Hives    Penicillin G Nausea and Vomiting       Review of Systems   Review of Systems   Respiratory: Positive for chest tightness. Gastrointestinal: Positive for nausea. All other systems reviewed and are negative. Physical Exam     Patient Vitals for the past 12 hrs:   Temp Pulse Resp BP SpO2   08/09/21 2007 99 °F (37.2 °C) 93 16 (!) 156/89 100 %       Physical Exam  Vitals and nursing note reviewed. Constitutional:       General: She is not in acute distress. Appearance: She is obese. HENT:      Head: Normocephalic and atraumatic. Eyes:      Extraocular Movements: Extraocular movements intact. Cardiovascular:      Rate and Rhythm: Normal rate. Heart sounds: Normal heart sounds. Pulmonary:      Effort: Pulmonary effort is normal.      Breath sounds: Normal breath sounds. Abdominal:      Palpations: Abdomen is soft. Tenderness: There is no abdominal tenderness. Musculoskeletal:         General: Normal range of motion. Cervical back: Normal range of motion and neck supple. Neurological:      General: No focal deficit present. Psychiatric:         Mood and Affect: Mood normal.         Behavior: Behavior normal.         Diagnostic Study Results     Labs -  No results found for this or any previous visit (from the past 12 hour(s)). Radiologic Studies -   No results found. Medical Decision Making     ED Course: Progress Notes, Reevaluation, and Consults:    9:03 PM Initial assessment performed. The patients presenting problems have been discussed, and they/their family are in agreement with the care plan formulated and outlined with them. I have encouraged them to ask questions as they arise throughout their visit. Provider Notes (Medical Decision Making): 58-year-old female presenting with chest tightness that is better after belching. Well-appearing on exam and not in distress. Patient is belching repeatedly during evaluation.   Likely dyspepsia however given her risk factors will get screening labs and EKG. Work-up is negative, patient improved with GI cocktail. Will discharge on a PPI. Procedures:     Critical Care Time:     Vital Signs-Reviewed the patient's vital signs. Reviewed pt's pulse ox reading. EKG: Interpreted by the EP. Time Interpreted:    Rate:    Rhythm:    Interpretation:   Comparison:     Records Reviewed: Nursing Notes, Old Medical Records and Previous electrocardiograms (Time of Review: 9:03 PM)  -I am the first provider for this patient.  -I reviewed the vital signs, available nursing notes, past medical history, past surgical history, family history and social history. Current Outpatient Medications   Medication Sig Dispense Refill    pantoprazole (Protonix) 20 mg tablet Take 20 mg by mouth daily.  medroxyPROGESTERone (DEPO-PROVERA) 150 mg/mL injection 150 mg by IntraMUSCular route once.  sertraline (ZOLOFT) 50 mg tablet Take 50 mg by mouth daily. Clinical Impression     Clinical Impression: No diagnosis found. Disposition: dc      This note was dictated utilizing voice recognition software which may lead to typographical errors. I apologize in advance if the situation occurs. If questions arise please do not hesitate to contact me or call our department.     Shagufta Alexander MD  9:03 PM

## 2021-12-03 ENCOUNTER — TRANSCRIBE ORDER (OUTPATIENT)
Dept: SCHEDULING | Age: 41
End: 2021-12-03

## 2021-12-03 DIAGNOSIS — Z12.31 VISIT FOR SCREENING MAMMOGRAM: Primary | ICD-10-CM

## 2021-12-13 ENCOUNTER — OFFICE VISIT (OUTPATIENT)
Dept: ORTHOPEDIC SURGERY | Age: 41
End: 2021-12-13
Payer: COMMERCIAL

## 2021-12-13 VITALS — HEIGHT: 69 IN | BODY MASS INDEX: 43.4 KG/M2 | WEIGHT: 293 LBS | TEMPERATURE: 97 F

## 2021-12-13 DIAGNOSIS — M25.561 ACUTE PAIN OF RIGHT KNEE: Primary | ICD-10-CM

## 2021-12-13 DIAGNOSIS — M17.11 PRIMARY OSTEOARTHRITIS OF RIGHT KNEE: ICD-10-CM

## 2021-12-13 PROCEDURE — 20610 DRAIN/INJ JOINT/BURSA W/O US: CPT | Performed by: SPECIALIST

## 2021-12-13 PROCEDURE — 73562 X-RAY EXAM OF KNEE 3: CPT | Performed by: SPECIALIST

## 2021-12-13 PROCEDURE — 99203 OFFICE O/P NEW LOW 30 MIN: CPT | Performed by: SPECIALIST

## 2021-12-13 RX ORDER — BETAMETHASONE SODIUM PHOSPHATE AND BETAMETHASONE ACETATE 3; 3 MG/ML; MG/ML
3 INJECTION, SUSPENSION INTRA-ARTICULAR; INTRALESIONAL; INTRAMUSCULAR; SOFT TISSUE ONCE
Status: COMPLETED | OUTPATIENT
Start: 2021-12-13 | End: 2021-12-13

## 2021-12-13 RX ORDER — PANTOPRAZOLE SODIUM 40 MG/1
TABLET, DELAYED RELEASE ORAL
COMMUNITY
Start: 2021-11-03

## 2021-12-13 RX ADMIN — BETAMETHASONE SODIUM PHOSPHATE AND BETAMETHASONE ACETATE 3 MG: 3; 3 INJECTION, SUSPENSION INTRA-ARTICULAR; INTRALESIONAL; INTRAMUSCULAR; SOFT TISSUE at 10:20

## 2021-12-13 NOTE — PROGRESS NOTES
Patient: Sophia Carreon                MRN: [id number]       SSN: [id number]  YOB: 1980        AGE: 37 y.o. SEX: female    PCP: Lila Schaefer MD  12/13/21    Chief Complaint   Patient presents with    Knee Pain     Right     HISTORY:  Sophia Carreon is a 37 y.o. female who is seen for right knee pain. She has been experiencing right knee pain for the past 3 weeks. She sprained her knee when she was a child but she does not recall any recent injury. She feels pain with standing, walking and stair climbing. She experiences startup pain after sitting. Pain Assessment  12/13/2021   Location of Pain Knee   Location Modifiers Right   Severity of Pain 6   Quality of Pain Aching;Burning   Duration of Pain Persistent   Frequency of Pain Intermittent   Date Pain First Started 11/15/2021   Aggravating Factors Standing;Walking; Other (Comment)   Aggravating Factors Comment Sitting   Limiting Behavior No   Relieving Factors Nothing   Result of Injury No     Occupation, etc:  Ms. Sean Disla works as a  at the [employer] for [employer]. She lives with 25 and 15 yo daughters and her 3 yo son. She is a single mother. Ms. Sean Disla weighs 350 lbs and is 5'9\" tall. She is not diabetic. No results found for: HBA1C, VUM1HUEA, LBG3NEYV, PRT1TAXF  Weight Metrics 12/13/2021 8/9/2021 10/20/2018 7/29/2016 6/22/2016 2/12/2014 5/10/2013   Weight 350 lb 340 lb 330 lb 325 lb 325 lb 314 lb 360 lb   BMI 51.69 kg/m2 50.21 kg/m2 48.73 kg/m2 47.97 kg/m2 47.97 kg/m2 46.35 kg/m2 53.14 kg/m2       Patient Active Problem List   Diagnosis Code    Eye injury S05.90XA    Gallstone pancreatitis K85.10    HTN (hypertension) I10    Depression F32. A    Morbid obesity due to excess calories (HCC) E66.01    Elevated transaminase level R74.01    Fatty infiltration of liver K76.0    Gastroesophageal reflux disease without esophagitis K21.9    Hypokalemia E87.6    Cholelithiasis with cholangitis without biliary obstruction K80.20, K83.09    Gall stone pancreatitis K85.10     REVIEW OF SYSTEMS:    Constitutional Symptoms: Negative   Eyes: Negative   Ears, Nose, Throat and Mouth: Negative   Cardiovascular: Negative   Respiratory: Negative   Genitourinary: Per HPI   Gastrointestinal: Per HPI   Integumentary (Skin and/or Breast): Negative   Musculoskeletal: Per HPI   Endocrine/Rheumatologic: Negative   Neurological: Per HPI   Hematology/Lymphatic: Negative    Allergic/Immunologic: Negative   Phychiatric: Negative    Social History     Socioeconomic History    Marital status: SINGLE     Spouse name: Not on file    Number of children: Not on file    Years of education: Not on file    Highest education level: Not on file   Occupational History    Not on file   Tobacco Use    Smoking status: Never Smoker    Smokeless tobacco: Never Used   Substance and Sexual Activity    Alcohol use: No    Drug use: No    Sexual activity: Yes     Partners: Male     Birth control/protection: Injection   Other Topics Concern    Not on file   Social History Narrative    Not on file     Social Determinants of Health     Financial Resource Strain:     Difficulty of Paying Living Expenses: Not on file   Food Insecurity:     Worried About Running Out of Food in the Last Year: Not on file    Christian of Food in the Last Year: Not on file   Transportation Needs:     Lack of Transportation (Medical): Not on file    Lack of Transportation (Non-Medical):  Not on file   Physical Activity:     Days of Exercise per Week: Not on file    Minutes of Exercise per Session: Not on file   Stress:     Feeling of Stress : Not on file   Social Connections:     Frequency of Communication with Friends and Family: Not on file    Frequency of Social Gatherings with Friends and Family: Not on file    Attends Pentecostal Services: Not on file    Active Member of Clubs or Organizations: Not on file    Attends Club or Organization Meetings: Not on file    Marital Status: Not on file   Intimate Partner Violence:     Fear of Current or Ex-Partner: Not on file    Emotionally Abused: Not on file    Physically Abused: Not on file    Sexually Abused: Not on file   Housing Stability:     Unable to Pay for Housing in the Last Year: Not on file    Number of [place]ar[place] in the Last Year: Not on file    Unstable Housing in the Last Year: Not on file      Allergies   Allergen Reactions    Other Food Hives and Swelling     Old [employer]ie Seasoning    Bactrim [Sulfamethoprim Ds] Hives    Penicillin G Nausea and Vomiting      Current Outpatient Medications   Medication Sig    pantoprazole (PROTONIX) 40 mg tablet     medroxyPROGESTERone (DEPO-PROVERA) 150 mg/mL injection 150 mg by IntraMUSCular route once.  sertraline (ZOLOFT) 50 mg tablet Take 50 mg by mouth daily. No current facility-administered medications for this visit.       PHYSICAL EXAMINATION:  Visit Vitals  Temp 97 °F (36.1 °C) (Temporal)   Ht 5' 9\" (1.753 m)   Wt 350 lb (158.8 kg)   BMI 51.69 kg/m²      ORTHO EXAMINATION:  Examination Right knee Left knee   Skin Intact Intact   Range of motion 120-0 120-0   Effusion - -   Medial joint line tenderness + -   Lateral joint line tenderness - -   Popliteal tenderness - -   Osteophytes palpable - -   Jenaros - -   Patella crepitus + +   Anterior drawer - -   Lateral laxity - -   Medial laxity - -   Varus deformity - -   Valgus deformity - -   Pretibial edema ++ -   Calf tenderness - -          TIME OUT:  Chart reviewed for the following:   IJaz MD, have reviewed the History, Physical and updated the Allergic reactions for Gingaleksander M Juan José   TIME OUT performed immediately prior to start of procedure:  Jennifer Chambers MD, have performed the following reviews on Gingsen M Hucks prior to the start of the procedure:          * Patient was identified by name and date of birth   * Agreement on procedure being performed was verified  * Risks and Benefits explained to the patient  * Procedure site verified and marked as necessary  * Patient was positioned for comfort  * Consent was obtained     Time: 10:16 AM     Date of procedure: 12/13/2021  Procedure performed by:  Nickie Steinberg MD  Ms. Can tolerated the procedure well with no complications. RADIOGRAPHS:  XR RIGHT KNEE 12/13/21 [facility]  IMPRESSION:  Three views with bilateral knees on AP view - No fractures, no effusion, mild bilateral joint space narrowing, no osteophytes present. Kellgren Boston grade 1. IMPRESSION:      ICD-10-CM ICD-9-CM    1. Acute pain of right knee  M25.561 719.46 AMB POC X-RAY KNEE 3 VIEW      betamethasone (CELESTONE) injection 3 mg      DRAIN/INJECT LARGE JOINT/BURSA   2. Primary osteoarthritis of right knee  M17.11 715.16 betamethasone (CELESTONE) injection 3 mg      DRAIN/INJECT LARGE JOINT/BURSA     PLAN: She will be referred for bariatric surgery consultation. At home exercises provided. After discussing treatment options, patient's right knee was injected with 4 cc Marcaine and 1/2 cc Celestone. There is no need for surgery at this time. We discussed a possible need for a right knee MRI in the future if pain continues. She will follow up as needed.       Scribed by MD Gena Link) as dictated by Nickie Steinberg MD

## 2021-12-13 NOTE — PATIENT INSTRUCTIONS
Knee Arthritis: Exercises  Introduction  Here are some examples of exercises for you to try. The exercises may be suggested for a condition or for rehabilitation. Start each exercise slowly. Ease off the exercises if you start to have pain. You will be told when to start these exercises and which ones will work best for you. How to do the exercises  Knee flexion with heel slide    1. Lie on your back with your knees bent. 2. Slide your heel back by bending your affected knee as far as you can. Then hook your other foot around your ankle to help pull your heel even farther back. 3. Hold for about 6 seconds, then rest for up to 10 seconds. 4. Repeat 8 to 12 times. 5. Switch legs and repeat steps 1 through 4, even if only one knee is sore. Quad sets    1. Sit with your affected leg straight and supported on the floor or a firm bed. Place a small, rolled-up towel under your knee. Your other leg should be bent, with that foot flat on the floor. 2. Tighten the thigh muscles of your affected leg by pressing the back of your knee down into the towel. 3. Hold for about 6 seconds, then rest for up to 10 seconds. 4. Repeat 8 to 12 times. 5. Switch legs and repeat steps 1 through 4, even if only one knee is sore. Straight-leg raises to the front    1. Lie on your back with your good knee bent so that your foot rests flat on the floor. Your affected leg should be straight. Make sure that your low back has a normal curve. You should be able to slip your hand in between the floor and the small of your back, with your palm touching the floor and your back touching the back of your hand. 2. Tighten the thigh muscles in your affected leg by pressing the back of your knee flat down to the floor. Hold your knee straight. 3. Keeping the thigh muscles tight and your leg straight, lift your affected leg up so that your heel is about 12 inches off the floor. Hold for about 6 seconds, then lower slowly.   4. Relax for up to 10 seconds between repetitions. 5. Repeat 8 to 12 times. 6. Switch legs and repeat steps 1 through 5, even if only one knee is sore. Active knee flexion    1. Lie on your stomach with your knees straight. If your kneecap is uncomfortable, roll up a washcloth and put it under your leg just above your kneecap. 2. Lift the foot of your affected leg by bending the knee so that you bring the foot up toward your buttock. If this motion hurts, try it without bending your knee quite as far. This may help you avoid any painful motion. 3. Slowly move your leg up and down. 4. Repeat 8 to 12 times. 5. Switch legs and repeat steps 1 through 4, even if only one knee is sore. Quadriceps stretch (facedown)    1. Lie flat on your stomach, and rest your face on the floor. 2. Wrap a towel or belt strap around the lower part of your affected leg. Then use the towel or belt strap to slowly pull your heel toward your buttock until you feel a stretch. 3. Hold for about 15 to 30 seconds, then relax your leg against the towel or belt strap. 4. Repeat 2 to 4 times. 5. Switch legs and repeat steps 1 through 4, even if only one knee is sore. Stationary exercise bike    1. If you do not have a stationary exercise bike at home, you can find one to ride at your local health club or community center. 2. Adjust the height of the bike seat so that your knee is slightly bent when your leg is extended downward. If your knee hurts when the pedal reaches the top, you can raise the seat so that your knee does not bend as much. 3. Start slowly. At first, try to do 5 to 10 minutes of cycling with little to no resistance. Then increase your time and the resistance bit by bit until you can do 20 to 30 minutes without pain. 4. If you start to have pain, rest your knee until your pain gets back to the level that is normal for you. Or cycle for less time or with less effort. Follow-up care is a key part of your treatment and safety.  Be sure to make and go to all appointments, and call your doctor if you are having problems. It's also a good idea to know your test results and keep a list of the medicines you take. Where can you learn more? Go to http://www.rodriguez.com/  Enter C159 in the search box to learn more about \"Knee Arthritis: Exercises. \"  Current as of: July 1, 2021               Content Version: 13.0  © 2006-2021 Healthwise, Incorporated. Care instructions adapted under license by [employer] (which disclaims liability or warranty for this information). If you have questions about a medical condition or this instruction, always ask your healthcare professional. [address] any warranty or liability for your use of this information.

## 2022-03-24 ENCOUNTER — TRANSCRIBE ORDER (OUTPATIENT)
Dept: REGISTRATION | Age: 42
End: 2022-03-24

## 2022-03-24 ENCOUNTER — HOSPITAL ENCOUNTER (OUTPATIENT)
Dept: LAB | Age: 42
Discharge: HOME | End: 2022-03-24
Payer: COMMERCIAL

## 2022-03-24 DIAGNOSIS — N92.6 IRREGULAR MENSTRUAL CYCLE: Primary | ICD-10-CM

## 2022-03-24 DIAGNOSIS — M54.50 LUMBAGO: ICD-10-CM

## 2022-03-24 DIAGNOSIS — K21.9 ESOPHAGEAL REFLUX: ICD-10-CM

## 2022-03-24 DIAGNOSIS — E66.01 MORBID OBESITY (HCC): ICD-10-CM

## 2022-03-24 DIAGNOSIS — F33.40 RECURRENT MAJOR DEPRESSION IN REMISSION (HCC): ICD-10-CM

## 2022-03-24 DIAGNOSIS — N92.6 IRREGULAR MENSTRUAL CYCLE: ICD-10-CM

## 2022-03-24 LAB
ALBUMIN SERPL-MCNC: 3.1 G/DL (ref 3.4–5)
ALBUMIN/GLOB SERPL: 0.7 {RATIO} (ref 0.8–1.7)
ALP SERPL-CCNC: 78 U/L (ref 45–117)
ALT SERPL-CCNC: 19 U/L (ref 13–56)
ANION GAP SERPL CALC-SCNC: 6 MMOL/L (ref 3–18)
AST SERPL-CCNC: 17 U/L (ref 10–38)
BASOPHILS # BLD: 0.1 K/UL (ref 0–0.1)
BASOPHILS NFR BLD: 1 % (ref 0–2)
BILIRUB SERPL-MCNC: 0.3 MG/DL (ref 0.2–1)
BUN SERPL-MCNC: 12 MG/DL (ref 7–18)
BUN/CREAT SERPL: 20 (ref 12–20)
CALCIUM SERPL-MCNC: 9 MG/DL (ref 8.5–10.1)
CHLORIDE SERPL-SCNC: 110 MMOL/L (ref 100–111)
CHOLEST SERPL-MCNC: 137 MG/DL
CO2 SERPL-SCNC: 24 MMOL/L (ref 21–32)
CREAT SERPL-MCNC: 0.61 MG/DL (ref 0.6–1.3)
DIFFERENTIAL METHOD BLD: ABNORMAL
EOSINOPHIL # BLD: 0.1 K/UL (ref 0–0.4)
EOSINOPHIL NFR BLD: 2 % (ref 0–5)
ERYTHROCYTE [DISTWIDTH] IN BLOOD BY AUTOMATED COUNT: 14.3 % (ref 11.6–14.5)
GLOBULIN SER CALC-MCNC: 4.3 G/DL (ref 2–4)
GLUCOSE SERPL-MCNC: 93 MG/DL (ref 74–99)
HCT VFR BLD AUTO: 39.4 % (ref 35–45)
HDLC SERPL-MCNC: 42 MG/DL (ref 40–60)
HDLC SERPL: 3.3 {RATIO} (ref 0–5)
HGB BLD-MCNC: 12.1 G/DL (ref 12–16)
IMM GRANULOCYTES # BLD AUTO: 0 K/UL (ref 0–0.04)
IMM GRANULOCYTES NFR BLD AUTO: 0 % (ref 0–0.5)
LDLC SERPL CALC-MCNC: 88.6 MG/DL (ref 0–100)
LIPID PROFILE,FLP: NORMAL
LYMPHOCYTES # BLD: 1.2 K/UL (ref 0.9–3.6)
LYMPHOCYTES NFR BLD: 17 % (ref 21–52)
MCH RBC QN AUTO: 26.1 PG (ref 24–34)
MCHC RBC AUTO-ENTMCNC: 30.7 G/DL (ref 31–37)
MCV RBC AUTO: 85.1 FL (ref 78–100)
MONOCYTES # BLD: 0.6 K/UL (ref 0.05–1.2)
MONOCYTES NFR BLD: 8 % (ref 3–10)
NEUTS SEG # BLD: 5.1 K/UL (ref 1.8–8)
NEUTS SEG NFR BLD: 72 % (ref 40–73)
NRBC # BLD: 0 K/UL (ref 0–0.01)
NRBC BLD-RTO: 0 PER 100 WBC
PLATELET # BLD AUTO: 413 K/UL (ref 135–420)
PMV BLD AUTO: 9.8 FL (ref 9.2–11.8)
POTASSIUM SERPL-SCNC: 4.6 MMOL/L (ref 3.5–5.5)
PROT SERPL-MCNC: 7.4 G/DL (ref 6.4–8.2)
RBC # BLD AUTO: 4.63 M/UL (ref 4.2–5.3)
SODIUM SERPL-SCNC: 140 MMOL/L (ref 136–145)
T4 SERPL-MCNC: 10.1 UG/DL (ref 4.8–13.9)
TRIGL SERPL-MCNC: 32 MG/DL (ref ?–150)
TSH SERPL DL<=0.05 MIU/L-ACNC: 1.02 UIU/ML (ref 0.36–3.74)
VLDLC SERPL CALC-MCNC: 6.4 MG/DL
WBC # BLD AUTO: 7.1 K/UL (ref 4.6–13.2)

## 2022-03-24 PROCEDURE — 36415 COLL VENOUS BLD VENIPUNCTURE: CPT

## 2022-03-24 PROCEDURE — 80061 LIPID PANEL: CPT

## 2022-03-24 PROCEDURE — 84443 ASSAY THYROID STIM HORMONE: CPT

## 2022-03-24 PROCEDURE — 84436 ASSAY OF TOTAL THYROXINE: CPT

## 2022-03-24 PROCEDURE — 85025 COMPLETE CBC W/AUTO DIFF WBC: CPT

## 2022-03-24 PROCEDURE — 80053 COMPREHEN METABOLIC PANEL: CPT

## 2022-08-29 ENCOUNTER — HOSPITAL ENCOUNTER (EMERGENCY)
Age: 42
Discharge: HOME | End: 2022-08-30
Attending: STUDENT IN AN ORGANIZED HEALTH CARE EDUCATION/TRAINING PROGRAM
Payer: COMMERCIAL

## 2022-08-29 DIAGNOSIS — R00.2 PALPITATIONS: ICD-10-CM

## 2022-08-29 DIAGNOSIS — R50.9 FEVER, UNSPECIFIED FEVER CAUSE: Primary | ICD-10-CM

## 2022-08-29 LAB
ANION GAP SERPL CALC-SCNC: 7 MMOL/L (ref 3–18)
APPEARANCE UR: CLEAR
BASOPHILS # BLD: 0.1 K/UL (ref 0–0.1)
BASOPHILS NFR BLD: 1 % (ref 0–2)
BILIRUB UR QL: NEGATIVE
BUN SERPL-MCNC: 18 MG/DL (ref 7–18)
BUN/CREAT SERPL: 25 (ref 12–20)
CALCIUM SERPL-MCNC: 9.1 MG/DL (ref 8.5–10.1)
CHLORIDE SERPL-SCNC: 110 MMOL/L (ref 100–111)
CO2 SERPL-SCNC: 24 MMOL/L (ref 21–32)
COLOR UR: YELLOW
CREAT SERPL-MCNC: 0.73 MG/DL (ref 0.6–1.3)
DIFFERENTIAL METHOD BLD: ABNORMAL
EOSINOPHIL # BLD: 0.2 K/UL (ref 0–0.4)
EOSINOPHIL NFR BLD: 2 % (ref 0–5)
ERYTHROCYTE [DISTWIDTH] IN BLOOD BY AUTOMATED COUNT: 14.2 % (ref 11.6–14.5)
GLUCOSE SERPL-MCNC: 122 MG/DL (ref 74–99)
GLUCOSE UR STRIP.AUTO-MCNC: NEGATIVE MG/DL
HCT VFR BLD AUTO: 39.7 % (ref 35–45)
HGB BLD-MCNC: 12.5 G/DL (ref 12–16)
HGB UR QL STRIP: NEGATIVE
IMM GRANULOCYTES # BLD AUTO: 0 K/UL (ref 0–0.04)
IMM GRANULOCYTES NFR BLD AUTO: 0 % (ref 0–0.5)
KETONES UR QL STRIP.AUTO: NEGATIVE MG/DL
LEUKOCYTE ESTERASE UR QL STRIP.AUTO: NEGATIVE
LYMPHOCYTES # BLD: 1.8 K/UL (ref 0.9–3.6)
LYMPHOCYTES NFR BLD: 23 % (ref 21–52)
MCH RBC QN AUTO: 26.2 PG (ref 24–34)
MCHC RBC AUTO-ENTMCNC: 31.5 G/DL (ref 31–37)
MCV RBC AUTO: 83.2 FL (ref 78–100)
MONOCYTES # BLD: 0.7 K/UL (ref 0.05–1.2)
MONOCYTES NFR BLD: 9 % (ref 3–10)
NEUTS SEG # BLD: 5.1 K/UL (ref 1.8–8)
NEUTS SEG NFR BLD: 65 % (ref 40–73)
NITRITE UR QL STRIP.AUTO: NEGATIVE
NRBC # BLD: 0 K/UL (ref 0–0.01)
NRBC BLD-RTO: 0 PER 100 WBC
PH UR STRIP: 6.5 [PH] (ref 5–8)
PLATELET # BLD AUTO: 422 K/UL (ref 135–420)
PMV BLD AUTO: 9.7 FL (ref 9.2–11.8)
POTASSIUM SERPL-SCNC: 3.4 MMOL/L (ref 3.5–5.5)
PROT UR STRIP-MCNC: NEGATIVE MG/DL
RBC # BLD AUTO: 4.77 M/UL (ref 4.2–5.3)
SODIUM SERPL-SCNC: 141 MMOL/L (ref 136–145)
SP GR UR REFRACTOMETRY: 1.01 (ref 1–1.03)
TROPONIN-HIGH SENSITIVITY: 6 NG/L (ref 0–54)
UROBILINOGEN UR QL STRIP.AUTO: 0.2 EU/DL (ref 0.2–1)
WBC # BLD AUTO: 7.9 K/UL (ref 4.6–13.2)

## 2022-08-29 PROCEDURE — 99285 EMERGENCY DEPT VISIT HI MDM: CPT

## 2022-08-29 PROCEDURE — 85025 COMPLETE CBC W/AUTO DIFF WBC: CPT

## 2022-08-29 PROCEDURE — 84484 ASSAY OF TROPONIN QUANT: CPT

## 2022-08-29 PROCEDURE — 81003 URINALYSIS AUTO W/O SCOPE: CPT

## 2022-08-29 PROCEDURE — 87636 SARSCOV2 & INF A&B AMP PRB: CPT

## 2022-08-29 PROCEDURE — 80048 BASIC METABOLIC PNL TOTAL CA: CPT

## 2022-08-29 PROCEDURE — 93005 ELECTROCARDIOGRAM TRACING: CPT

## 2022-08-29 NOTE — LETTER
NOTIFICATION RETURN TO WORK / SCHOOL    8/30/2022 3:12 AM    Ms. Cullen [place] [phone]      To Whom It May Concern:    Verena Hsu is currently under the care of [facility]. She will return to work/school on: 08/31/2022    If there are questions or concerns please have the patient contact our office.         Sincerely,            Marie Orosco RN

## 2022-08-30 ENCOUNTER — APPOINTMENT (OUTPATIENT)
Dept: GENERAL RADIOLOGY | Age: 42
End: 2022-08-30
Attending: PHYSICIAN ASSISTANT
Payer: COMMERCIAL

## 2022-08-30 VITALS
OXYGEN SATURATION: 100 % | SYSTOLIC BLOOD PRESSURE: 129 MMHG | DIASTOLIC BLOOD PRESSURE: 85 MMHG | TEMPERATURE: 98.7 F | HEART RATE: 77 BPM

## 2022-08-30 LAB
ATRIAL RATE: 115 BPM
CALCULATED P AXIS, ECG09: 52 DEGREES
CALCULATED R AXIS, ECG10: -21 DEGREES
CALCULATED T AXIS, ECG11: 31 DEGREES
DIAGNOSIS, 93000: NORMAL
FLUAV RNA SPEC QL NAA+PROBE: NOT DETECTED
FLUBV RNA SPEC QL NAA+PROBE: NOT DETECTED
P-R INTERVAL, ECG05: 160 MS
Q-T INTERVAL, ECG07: 328 MS
QRS DURATION, ECG06: 88 MS
QTC CALCULATION (BEZET), ECG08: 453 MS
SARS-COV-2, COV2: NOT DETECTED
VENTRICULAR RATE, ECG03: 115 BPM

## 2022-08-30 PROCEDURE — 96360 HYDRATION IV INFUSION INIT: CPT

## 2022-08-30 PROCEDURE — 71045 X-RAY EXAM CHEST 1 VIEW: CPT

## 2022-08-30 PROCEDURE — 74011250636 HC RX REV CODE- 250/636: Performed by: PHYSICIAN ASSISTANT

## 2022-08-30 PROCEDURE — 74011250637 HC RX REV CODE- 250/637: Performed by: PHYSICIAN ASSISTANT

## 2022-08-30 RX ORDER — ACETAMINOPHEN 500 MG
1000 TABLET ORAL
Status: COMPLETED | OUTPATIENT
Start: 2022-08-30 | End: 2022-08-30

## 2022-08-30 RX ORDER — IBUPROFEN 600 MG/1
600 TABLET ORAL
Qty: 20 TABLET | Refills: 0 | Status: SHIPPED | OUTPATIENT
Start: 2022-08-30

## 2022-08-30 RX ADMIN — SODIUM CHLORIDE 1000 ML: 9 INJECTION, SOLUTION INTRAVENOUS at 02:01

## 2022-08-30 RX ADMIN — ACETAMINOPHEN 1000 MG: 500 TABLET ORAL at 02:01

## 2022-08-30 NOTE — ED PROVIDER NOTES
EMERGENCY DEPARTMENT HISTORY AND PHYSICAL EXAM    Date: 8/29/2022  Patient Name: Mark Mon    History of Presenting Illness     Chief Complaint   Patient presents with    Anxiety         History Provided By:patient     Chief Complaint: palpitations, anxiety  Duration: few hrs   Timing: acute  Location:chest  Quality: fluttering, racing   Severity:moderate  Modifying Factors: none   Associated Symptoms: anxiety      Additional History (Context): Mark Mon is a 40 y.o. female with PMH anxiety, depression, htn, gallstones, and GERD who presents with c/o acute onset palpitations and heart racing that began a few hrs ago. Pt states she has a hx of anxiety and wasn't sure if her palpitations were anxiety related. She denies any known fever, chills, COB, chest pain, and cough/cold sx. Denies abd complaints. No known sick exposures. No other complaints reported at this time. PCP: Tracie Watson MD    Current Facility-Administered Medications   Medication Dose Route Frequency Provider Last Rate Last Admin    acetaminophen (TYLENOL) tablet 1,000 mg  1,000 mg Oral NOW Rosario Granger PA-C        sodium chloride 0.9 % bolus infusion 1,000 mL  1,000 mL IntraVENous ONCE Rosario Granger PA-C         Current Outpatient Medications   Medication Sig Dispense Refill    ibuprofen (MOTRIN) 600 mg tablet Take 1 Tablet by mouth every six (6) hours as needed for Pain. 20 Tablet 0    pantoprazole (PROTONIX) 40 mg tablet       medroxyPROGESTERone (DEPO-PROVERA) 150 mg/mL injection 150 mg by IntraMUSCular route once. sertraline (ZOLOFT) 50 mg tablet Take 50 mg by mouth daily.          Past History     Past Medical History:  Past Medical History:   Diagnosis Date    Anxiety     Anxiety     Depression     Elevated transaminase level 6/22/2016    Fatty infiltration of liver 6/22/2016    Gallstones     Gastroesophageal reflux disease without esophagitis 6/22/2016    GERD (gastroesophageal reflux disease)     HTN (hypertension) 2016    pt denies    Morbid obesity due to excess calories ([address] 2016       Past Surgical History:  Past Surgical History:   Procedure Laterality Date    [place]  DELIVERY ONLY      [place],CHOLECYSTECTOMY  16    Dr. Tianna Arellano       Family History:  No family history on file. Social History:  Social History     Tobacco Use    Smoking status: Never    Smokeless tobacco: Never   Substance Use Topics    Alcohol use: No    Drug use: No       Allergies: Allergies   Allergen Reactions    Other Food Hives and Swelling     Old Coffee Seasoning    Bactrim [Sulfamethoprim Ds] Hives    Penicillin G Nausea and Vomiting         Review of Systems   Review of Systems   Constitutional: Negative. Negative for chills and fever. HENT: Negative. Negative for congestion, ear pain and rhinorrhea. Eyes: Negative. Negative for pain and redness. Respiratory: Negative. Negative for cough, shortness of breath, wheezing and stridor. Cardiovascular:  Positive for palpitations. Negative for chest pain and leg swelling. Gastrointestinal: Negative. Negative for abdominal pain, constipation, diarrhea, nausea and vomiting. Genitourinary: Negative. Negative for dysuria and frequency. Musculoskeletal: Negative. Negative for back pain and neck pain. Skin: Negative. Negative for rash and wound. Neurological: Negative. Negative for dizziness, seizures, syncope and headaches. Psychiatric/Behavioral:  The patient is nervous/anxious. All other systems reviewed and are negative. All Other Systems Negative  Physical Exam     Vitals:    22 2138   BP: 129/85   Pulse: (!) 113   Temp: (!) 100.5 °F (38.1 °C)   SpO2: 100%     Physical Exam  Vitals and nursing note reviewed. Constitutional:       General: She is not in acute distress. Appearance: She is well-developed. She is obese. She is not diaphoretic. HENT:      Head: Normocephalic and atraumatic.    Eyes:      General: No scleral icterus. Right eye: No discharge. Left eye: No discharge. Conjunctiva/sclera: Conjunctivae normal.   Cardiovascular:      Rate and Rhythm: Regular rhythm. Tachycardia present. Heart sounds: Normal heart sounds. No murmur heard. No friction rub. No gallop. Comments: Slightly tachycardiac   Pulmonary:      Effort: Pulmonary effort is normal. No respiratory distress. Breath sounds: Normal breath sounds. No stridor. No wheezing, rhonchi or rales. Musculoskeletal:         General: Normal range of motion. Cervical back: Normal range of motion and neck supple. Skin:     General: Skin is warm and dry. Findings: No erythema or rash. Neurological:      General: No focal deficit present. Mental Status: She is alert and oriented to person, place, and time. Mental status is at baseline. Coordination: Coordination normal.      Comments: Gait is steady and patient exhibits no evidence of ataxia. Patient is able to ambulate without difficulty. No focal neurological deficit noted. No facial droop, slurred speech, or evidence of altered mentation noted on exam.       Psychiatric:         Behavior: Behavior normal.         Thought Content:  Thought content normal.              Diagnostic Study Results     Labs -     Recent Results (from the past 12 hour(s))   EKG, 12 LEAD, INITIAL    Collection Time: 08/29/22  9:20 PM   Result Value Ref Range    Ventricular Rate 115 BPM    Atrial Rate 115 BPM    P-R Interval 160 ms    QRS Duration 88 ms    Q-T Interval 328 ms    QTC Calculation (Bezet) 453 ms    Calculated P Axis 52 degrees    Calculated R Axis -21 degrees    Calculated T Axis 31 degrees    Diagnosis       Sinus tachycardia  Septal infarct , age undetermined  Abnormal ECG  When compared with ECG of 09-AUG-2021 19:56,  Septal infarct is now present     CBC WITH AUTOMATED DIFF    Collection Time: 08/29/22  9:30 PM   Result Value Ref Range    WBC 7.9 4.6 - 13.2 K/uL    RBC 4. 77 4.20 - 5.30 M/uL    HGB 12.5 12.0 - 16.0 g/dL    HCT 39.7 35.0 - 45.0 %    MCV 83.2 78.0 - 100.0 FL    MCH 26.2 24.0 - 34.0 PG    MCHC 31.5 31.0 - 37.0 g/dL    RDW 14.2 11.6 - 14.5 %    PLATELET 403 (H) 217 - 420 K/uL    MPV 9.7 9.2 - 11.8 FL    NRBC 0.0 0  WBC    ABSOLUTE NRBC 0.00 0.00 - 0.01 K/uL    NEUTROPHILS 65 40 - 73 %    LYMPHOCYTES 23 21 - 52 %    MONOCYTES 9 3 - 10 %    EOSINOPHILS 2 0 - 5 %    BASOPHILS 1 0 - 2 %    IMMATURE GRANULOCYTES 0 0.0 - 0.5 %    ABS. NEUTROPHILS 5.1 1.8 - 8.0 K/UL    ABS. LYMPHOCYTES 1.8 0.9 - 3.6 K/UL    ABS. MONOCYTES 0.7 0.05 - 1.2 K/UL    ABS. EOSINOPHILS 0.2 0.0 - 0.4 K/UL    ABS. BASOPHILS 0.1 0.0 - 0.1 K/UL    ABS. IMM.  GRANS. 0.0 0.00 - 0.04 K/UL    DF AUTOMATED     METABOLIC PANEL, BASIC    Collection Time: 08/29/22  9:30 PM   Result Value Ref Range    Sodium 141 136 - 145 mmol/L    Potassium 3.4 (L) 3.5 - 5.5 mmol/L    Chloride 110 100 - 111 mmol/L    CO2 24 21 - 32 mmol/L    Anion gap 7 3.0 - 18 mmol/L    Glucose 122 (H) 74 - 99 mg/dL    BUN 18 7.0 - 18 MG/DL    Creatinine 0.73 0.6 - 1.3 MG/DL    BUN/Creatinine ratio 25 (H) 12 - 20      GFR est AA >60 >60 ml/min/1.73m2    GFR est non-AA >60 >60 ml/min/1.73m2    Calcium 9.1 8.5 - 10.1 MG/DL   TROPONIN-HIGH SENSITIVITY    Collection Time: 08/29/22  9:30 PM   Result Value Ref Range    Troponin-High Sensitivity 6 0 - 54 ng/L   URINALYSIS W/ RFLX MICROSCOPIC    Collection Time: 08/29/22 11:11 PM   Result Value Ref Range    Color YELLOW      Appearance CLEAR      Specific gravity 1.009 1.005 - 1.030      pH (UA) 6.5 5.0 - 8.0      Protein Negative NEG mg/dL    Glucose Negative NEG mg/dL    Ketone Negative NEG mg/dL    Bilirubin Negative NEG      Blood Negative NEG      Urobilinogen 0.2 0.2 - 1.0 EU/dL    Nitrites Negative NEG      Leukocyte Esterase Negative NEG     COVID-19 WITH INFLUENZA A/B    Collection Time: 08/29/22 11:56 PM   Result Value Ref Range    SARS-CoV-2 by PCR Not detected NOTD      Influenza A by PCR Not detected NOTD      Influenza B by PCR Not detected NOTD         Radiologic Studies -   XR CHEST SNGL V    (Results Pending)     CT Results  (Last 48 hours)      None          CXR Results  (Last 48 hours)      None              Medical Decision Making   I am the first provider for this patient. I reviewed the vital signs, available nursing notes, past medical history, past surgical history, family history and social history. Vital Signs-Reviewed the patient's vital signs. Records Reviewed: Rosario Granger PA-C     Procedures:  Procedures    Provider Notes (Medical Decision Making): Impression:  fever, palpitations, viral syndrome    EKG sinus tachycardia, rate 115, no STEMI, no acute changes, reviewed by myself and the ED attending  Chest x-ray negative for definite acute process  Labs: K 3.4, otherwise unremarkable, covid/flu negative, UA negative  Pt given tylenol and IVF in the ED, feeling better, will plan to d/c with pcp follow-up and supportive care return precautions advised. Pt agrees. Rosario Granger PA-C     MED RECONCILIATION:  Current Facility-Administered Medications   Medication Dose Route Frequency    acetaminophen (TYLENOL) tablet 1,000 mg  1,000 mg Oral NOW    sodium chloride 0.9 % bolus infusion 1,000 mL  1,000 mL IntraVENous ONCE     Current Outpatient Medications   Medication Sig    ibuprofen (MOTRIN) 600 mg tablet Take 1 Tablet by mouth every six (6) hours as needed for Pain.    pantoprazole (PROTONIX) 40 mg tablet     medroxyPROGESTERone (DEPO-PROVERA) 150 mg/mL injection 150 mg by IntraMUSCular route once. sertraline (ZOLOFT) 50 mg tablet Take 50 mg by mouth daily. Disposition:  D.c    DISCHARGE NOTE:   Patient is stable for discharge at this time. I have discussed all the findings from today's work up with the patient, including lab results and imaging. I have answered all questions. Rx for motrin given.  Rest and close follow-up with the PCP recommended this week. Return to the ED immediately for any new or worsening symptoms. Rosario Quick PA-C     Follow-up Information       Follow up With Specialties Details Why Contact Info    Yesica Chong MD Internal Medicine Physician In 2 days  [address] [place] [phone]      [facility] Emergency Medicine  As needed, If symptoms worsen [address] [facility] [address] [phone]            Current Discharge Medication List        START taking these medications    Details   ibuprofen (MOTRIN) 600 mg tablet Take 1 Tablet by mouth every six (6) hours as needed for Pain. Qty: 20 Tablet, Refills: 0  Start date: 8/30/2022                 Diagnosis     Clinical Impression:   1. Fever, unspecified fever cause    2.  Palpitations

## 2022-08-30 NOTE — DISCHARGE INSTRUCTIONS
[employer] Activation    Thank you for requesting access to [employer]. Please follow the instructions below to securely access and download your online medical record. [employer] allows you to send messages to your doctor, view your test results, renew your prescriptions, schedule appointments, and more. How Do I Sign Up? In your internet browser, go to [email or web address]  Click on the First Time User? Click Here link in the Sign In box. You will be redirect to the New Member Sign Up page. Enter your [employer] Access Code exactly as it appears below. You will not need to use this code after youve completed the sign-up process. If you do not sign up before the expiration date, you must request a new code. [employer] Access Code: Activation code not generated  Current [employer] Status: Active (This is the date your [employer] access code will )    Enter the last four digits of your Social Security Number (xxxx) and Date of Birth (mm/dd/yyyy) as indicated and click Submit. You will be taken to the next sign-up page. Create a [employer] ID. This will be your [employer] login ID and cannot be changed, so think of one that is secure and easy to remember. Create a [employer] password. You can change your password at any time. Enter your Password Reset Question and Answer. This can be used at a later time if you forget your password. Enter your e-mail address. You will receive e-mail notification when new information is available in [address] Click Sign Up. You can now view and download portions of your medical record. Click the [place] link to download a portable copy of your medical information. Additional Information    If you have questions, please visit the Frequently Asked Questions section of the [employer] website at [email or web address] Remember, [employer] is NOT to be used for urgent needs. For medical emergencies, dial 911.

## 2022-08-30 NOTE — ED TRIAGE NOTES
Patient comes in thinking that she is having an anxiety attack. Patient states that she looked at her watch today and noticed that her heart rate was elevated. Patient states that she takes Zoloft for anxiety and took it right before she came in because she could not find it this morning.

## 2022-10-10 ENCOUNTER — HOSPITAL ENCOUNTER (OUTPATIENT)
Dept: MAMMOGRAPHY | Age: 42
Discharge: HOME | End: 2022-10-10
Attending: INTERNAL MEDICINE
Payer: COMMERCIAL

## 2022-10-10 DIAGNOSIS — Z12.31 VISIT FOR SCREENING MAMMOGRAM: ICD-10-CM

## 2022-10-10 PROCEDURE — 77067 SCR MAMMO BI INCL CAD: CPT

## 2022-10-25 ENCOUNTER — TRANSCRIBE ORDER (OUTPATIENT)
Dept: SCHEDULING | Age: 42
End: 2022-10-25

## 2022-10-25 DIAGNOSIS — R92.8 ABNORMAL MAMMOGRAM OF RIGHT BREAST: Primary | ICD-10-CM

## 2022-11-02 ENCOUNTER — TRANSCRIBE ORDER (OUTPATIENT)
Dept: SCHEDULING | Age: 42
End: 2022-11-02

## 2022-11-02 DIAGNOSIS — R92.8 ABNORMAL MAMMOGRAM: Primary | ICD-10-CM

## 2022-11-17 ENCOUNTER — HOSPITAL ENCOUNTER (OUTPATIENT)
Dept: MAMMOGRAPHY | Age: 42
Discharge: HOME | End: 2022-11-17
Attending: INTERNAL MEDICINE
Payer: COMMERCIAL

## 2022-11-17 ENCOUNTER — HOSPITAL ENCOUNTER (OUTPATIENT)
Dept: ULTRASOUND IMAGING | Age: 42
Discharge: HOME | End: 2022-11-17
Attending: INTERNAL MEDICINE
Payer: COMMERCIAL

## 2022-11-17 DIAGNOSIS — R92.8 ABNORMAL MAMMOGRAM OF RIGHT BREAST: ICD-10-CM

## 2022-11-17 DIAGNOSIS — R92.8 ABNORMAL MAMMOGRAM: ICD-10-CM

## 2022-11-17 PROCEDURE — 77061 BREAST TOMOSYNTHESIS UNI: CPT

## 2022-11-17 PROCEDURE — 76642 ULTRASOUND BREAST LIMITED: CPT

## 2023-01-31 DIAGNOSIS — R92.8 ABNORMAL MAMMOGRAM OF RIGHT BREAST: Primary | ICD-10-CM

## 2023-02-04 DIAGNOSIS — R92.8 ABNORMAL MAMMOGRAM OF RIGHT BREAST: Primary | ICD-10-CM

## 2023-03-10 ENCOUNTER — TRANSCRIBE ORDERS (OUTPATIENT)
Facility: HOSPITAL | Age: 43
End: 2023-03-10

## 2023-03-10 ENCOUNTER — HOSPITAL ENCOUNTER (OUTPATIENT)
Facility: HOSPITAL | Age: 43
Discharge: HOME | End: 2023-03-10
Payer: COMMERCIAL

## 2023-03-10 DIAGNOSIS — E66.01 MORBID OBESITY (HCC): ICD-10-CM

## 2023-03-10 DIAGNOSIS — N92.6 IRREGULAR MENSTRUAL CYCLE: ICD-10-CM

## 2023-03-10 DIAGNOSIS — K21.9 GASTRIC REFLUX: ICD-10-CM

## 2023-03-10 DIAGNOSIS — M54.50 LUMBAR PAIN: ICD-10-CM

## 2023-03-10 DIAGNOSIS — N92.6 IRREGULAR MENSTRUAL CYCLE: Primary | ICD-10-CM

## 2023-03-10 DIAGNOSIS — F33.40 RECURRENT MAJOR DEPRESSION IN REMISSION (HCC): ICD-10-CM

## 2023-03-10 DIAGNOSIS — I10 HYPERTENSION, UNSPECIFIED TYPE: ICD-10-CM

## 2023-03-10 LAB
ALBUMIN SERPL-MCNC: 2.9 G/DL (ref 3.4–5)
ALBUMIN/GLOB SERPL: 0.7 (ref 0.8–1.7)
ALP SERPL-CCNC: 89 U/L (ref 45–117)
ALT SERPL-CCNC: 16 U/L (ref 13–56)
ANION GAP SERPL CALC-SCNC: ABNORMAL MMOL/L (ref 3–18)
AST SERPL-CCNC: 11 U/L (ref 10–38)
BASOPHILS # BLD: 0 K/UL (ref 0–0.1)
BASOPHILS NFR BLD: 1 % (ref 0–2)
BILIRUB SERPL-MCNC: 0.2 MG/DL (ref 0.2–1)
BUN SERPL-MCNC: 13 MG/DL (ref 7–18)
BUN/CREAT SERPL: 22 (ref 12–20)
CALCIUM SERPL-MCNC: 8.5 MG/DL (ref 8.5–10.1)
CHLORIDE SERPL-SCNC: 113 MMOL/L (ref 100–111)
CHOLEST SERPL-MCNC: 136 MG/DL
CO2 SERPL-SCNC: 27 MMOL/L (ref 21–32)
CREAT SERPL-MCNC: 0.58 MG/DL (ref 0.6–1.3)
DIFFERENTIAL METHOD BLD: ABNORMAL
EOSINOPHIL # BLD: 0.1 K/UL (ref 0–0.4)
EOSINOPHIL NFR BLD: 2 % (ref 0–5)
ERYTHROCYTE [DISTWIDTH] IN BLOOD BY AUTOMATED COUNT: 13.9 % (ref 11.6–14.5)
GLOBULIN SER CALC-MCNC: 4.3 G/DL (ref 2–4)
GLUCOSE SERPL-MCNC: 98 MG/DL (ref 74–99)
HCT VFR BLD AUTO: 37.8 % (ref 35–45)
HDLC SERPL-MCNC: 46 MG/DL (ref 40–60)
HDLC SERPL: 3 (ref 0–5)
HGB BLD-MCNC: 11.9 G/DL (ref 12–16)
IMM GRANULOCYTES # BLD AUTO: 0 K/UL (ref 0–0.04)
IMM GRANULOCYTES NFR BLD AUTO: 0 % (ref 0–0.5)
LDLC SERPL CALC-MCNC: 82.8 MG/DL (ref 0–100)
LIPID PANEL: NORMAL
LYMPHOCYTES # BLD: 1.1 K/UL (ref 0.9–3.6)
LYMPHOCYTES NFR BLD: 16 % (ref 21–52)
MCH RBC QN AUTO: 26.4 PG (ref 24–34)
MCHC RBC AUTO-ENTMCNC: 31.5 G/DL (ref 31–37)
MCV RBC AUTO: 83.8 FL (ref 78–100)
MONOCYTES # BLD: 0.6 K/UL (ref 0.05–1.2)
MONOCYTES NFR BLD: 9 % (ref 3–10)
NEUTS SEG # BLD: 5 K/UL (ref 1.8–8)
NEUTS SEG NFR BLD: 73 % (ref 40–73)
NRBC # BLD: 0 K/UL (ref 0–0.01)
NRBC BLD-RTO: 0 PER 100 WBC
PLATELET # BLD AUTO: 402 K/UL (ref 135–420)
PMV BLD AUTO: 9.6 FL (ref 9.2–11.8)
POTASSIUM SERPL-SCNC: 3.9 MMOL/L (ref 3.5–5.5)
PROT SERPL-MCNC: 7.2 G/DL (ref 6.4–8.2)
RBC # BLD AUTO: 4.51 M/UL (ref 4.2–5.3)
SODIUM SERPL-SCNC: 137 MMOL/L (ref 136–145)
T4 SERPL-MCNC: 11 UG/DL (ref 4.8–13.9)
TRIGL SERPL-MCNC: 36 MG/DL
TSH SERPL DL<=0.05 MIU/L-ACNC: 1.25 UIU/ML (ref 0.36–3.74)
VLDLC SERPL CALC-MCNC: 7.2 MG/DL
WBC # BLD AUTO: 6.8 K/UL (ref 4.6–13.2)

## 2023-03-10 PROCEDURE — 36415 COLL VENOUS BLD VENIPUNCTURE: CPT

## 2023-03-10 PROCEDURE — 80061 LIPID PANEL: CPT

## 2023-03-10 PROCEDURE — 84443 ASSAY THYROID STIM HORMONE: CPT

## 2023-03-10 PROCEDURE — 80053 COMPREHEN METABOLIC PANEL: CPT

## 2023-03-10 PROCEDURE — 84436 ASSAY OF TOTAL THYROXINE: CPT

## 2023-03-10 PROCEDURE — 85025 COMPLETE CBC W/AUTO DIFF WBC: CPT

## 2024-08-15 NOTE — PROGRESS NOTES
Patient: Mickie Washington                MRN: [id number]       SSN: [id number]  YOB: 1980        AGE: 44 y.o.        SEX: female      PCP: Cecilio Gagnon MD  24    Chief Complaint   Patient presents with    Knee Pain     Bilateral knee      HISTORY:  Mickie Washington is a 44 y.o. female who is seen for left knee pain.  She has pain related to a remote injury years ago.  She sprained her left knee in childhood while dancing. She reinjured her left knee when she slipped and fell in the grass at a  2 months ago. She feels knee pain with standing, walking and stair climbing.  She experiences startup pain after sitting.      She was previously seen for right knee pain--responded to cortisone injection.     Occupation, etc: Ms. Washington works as a  at the [employer].  She lives with 18 and 15 yo daughters and her 3 yo son. She is a single mother.   Ms. Washington weighs 300 lbs and is 5'9\" tall. She has lost 10 lbs while taking Ozempic. She is not diabetic.     Wt Readings from Last 3 Encounters:   24 136.1 kg (300 lb)   21 (!) 158.8 kg (350 lb)      Body mass index is 44.3 kg/m².    Patient Active Problem List   Diagnosis    Eye injury    Depression    HTN (hypertension)    Gallstone pancreatitis    Morbid obesity due to excess calories (HCC)    Elevated transaminase level    Gall stone pancreatitis    Fatty infiltration of liver    Hypokalemia    Gastroesophageal reflux disease without esophagitis    Cholelithiasis with cholangitis without biliary obstruction       Social History     Tobacco Use    Smoking status: Never    Smokeless tobacco: Never   Substance Use Topics    Alcohol use: No    Drug use: No        Allergies   Allergen Reactions    Other Hives and Swelling     Old [place] Seasoning    Sulfa Antibiotics Hives    Penicillin G Nausea And Vomiting        Current Outpatient Medications   Medication Sig    ibuprofen (ADVIL;MOTRIN) 600 MG

## 2024-08-16 ENCOUNTER — OFFICE VISIT (OUTPATIENT)
Age: 44
End: 2024-08-16

## 2024-08-16 VITALS — HEIGHT: 69 IN | TEMPERATURE: 97 F | WEIGHT: 293 LBS | BODY MASS INDEX: 43.4 KG/M2

## 2024-08-16 DIAGNOSIS — G89.29 CHRONIC PAIN OF LEFT KNEE: ICD-10-CM

## 2024-08-16 DIAGNOSIS — M25.562 CHRONIC PAIN OF LEFT KNEE: ICD-10-CM

## 2024-08-16 DIAGNOSIS — G89.29 CHRONIC PAIN OF RIGHT KNEE: Primary | ICD-10-CM

## 2024-08-16 DIAGNOSIS — M17.11 UNILATERAL PRIMARY OSTEOARTHRITIS, RIGHT KNEE: ICD-10-CM

## 2024-08-16 DIAGNOSIS — M17.12 UNILATERAL PRIMARY OSTEOARTHRITIS, LEFT KNEE: ICD-10-CM

## 2024-08-16 DIAGNOSIS — M25.561 CHRONIC PAIN OF RIGHT KNEE: Primary | ICD-10-CM

## 2024-08-16 RX ORDER — BUPIVACAINE HYDROCHLORIDE 5 MG/ML
4 INJECTION, SOLUTION PERINEURAL ONCE
Status: COMPLETED | OUTPATIENT
Start: 2024-08-16 | End: 2024-08-16

## 2024-08-16 RX ORDER — BETAMETHASONE SODIUM PHOSPHATE AND BETAMETHASONE ACETATE 3; 3 MG/ML; MG/ML
3 INJECTION, SUSPENSION INTRA-ARTICULAR; INTRALESIONAL; INTRAMUSCULAR; SOFT TISSUE ONCE
Status: COMPLETED | OUTPATIENT
Start: 2024-08-16 | End: 2024-08-16

## 2024-08-16 RX ADMIN — BETAMETHASONE SODIUM PHOSPHATE AND BETAMETHASONE ACETATE 3 MG: 3; 3 INJECTION, SUSPENSION INTRA-ARTICULAR; INTRALESIONAL; INTRAMUSCULAR; SOFT TISSUE at 16:40

## 2024-08-16 RX ADMIN — BUPIVACAINE HYDROCHLORIDE 20 MG: 5 INJECTION, SOLUTION PERINEURAL at 16:41

## 2025-01-13 NOTE — PROGRESS NOTES
Patient: Mickie Washington                MRN: [id number]       SSN: [id number]  YOB: 1980        AGE: 44 y.o.        SEX: female      PCP: Cecilio Gagnon MD  25    Chief Complaint   Patient presents with    Follow-up     Bilateral knee pain     HISTORY:  Mickie Washington is a 44 y.o. female who is seen for increased bilateral knee pain. She responded to her left knee injection last ov but her pains have returned. She feels knee pain with standing, walking and stair climbing. She experiences startup pain after sitting. She has tried a variety of conservative measures including NSAIDs, injections, and activity restrictions all with incomplete temporary relief.    She sprained her left knee in childhood while dancing. She reinjured her left knee a few years ago when she slipped and fell in the grass at a .      She was also previously seen for right knee pain--responded to cortisone injection.      Occupation, etc: Ms. Washington works as a [employer]  at the [employer].  She lives with 18 and 13 yo daughters and her 3 yo son in [place]. She is a single mother.   Ms. Washington weighs 300 lbs and is 5'9\" tall. She has lost 20 lbs while taking Ozempic. She is not diabetic.     Wt Readings from Last 3 Encounters:   24 136.1 kg (300 lb)   21 (!) 158.8 kg (350 lb)      Body mass index is 44.3 kg/m².    Patient Active Problem List   Diagnosis    Eye injury    Depression    HTN (hypertension)    Gallstone pancreatitis    Morbid obesity due to excess calories    Elevated transaminase level    Gall stone pancreatitis    Fatty infiltration of liver    Hypokalemia    Gastroesophageal reflux disease without esophagitis    Cholelithiasis with cholangitis without biliary obstruction       Social History     Tobacco Use    Smoking status: Never    Smokeless tobacco: Never   Substance Use Topics    Alcohol use: No    Drug use: No        Allergies   Allergen Reactions

## 2025-01-16 ENCOUNTER — OFFICE VISIT (OUTPATIENT)
Age: 45
End: 2025-01-16

## 2025-01-16 VITALS — HEIGHT: 69 IN | RESPIRATION RATE: 16 BRPM | BODY MASS INDEX: 44.3 KG/M2

## 2025-01-16 DIAGNOSIS — M17.12 UNILATERAL PRIMARY OSTEOARTHRITIS, LEFT KNEE: ICD-10-CM

## 2025-01-16 DIAGNOSIS — M17.11 UNILATERAL PRIMARY OSTEOARTHRITIS, RIGHT KNEE: ICD-10-CM

## 2025-01-16 DIAGNOSIS — M22.42 CHONDROMALACIA, PATELLA, LEFT: ICD-10-CM

## 2025-01-16 DIAGNOSIS — M22.41 CHONDROMALACIA, PATELLA, RIGHT: ICD-10-CM

## 2025-01-16 DIAGNOSIS — M25.562 CHRONIC PAIN OF LEFT KNEE: Primary | ICD-10-CM

## 2025-01-16 DIAGNOSIS — G89.29 CHRONIC PAIN OF LEFT KNEE: Primary | ICD-10-CM

## 2025-01-16 DIAGNOSIS — M25.561 CHRONIC PAIN OF RIGHT KNEE: ICD-10-CM

## 2025-01-16 DIAGNOSIS — G89.29 CHRONIC PAIN OF RIGHT KNEE: ICD-10-CM

## 2025-01-16 RX ORDER — BETAMETHASONE SODIUM PHOSPHATE AND BETAMETHASONE ACETATE 3; 3 MG/ML; MG/ML
3 INJECTION, SUSPENSION INTRA-ARTICULAR; INTRALESIONAL; INTRAMUSCULAR; SOFT TISSUE ONCE
Status: COMPLETED | OUTPATIENT
Start: 2025-01-16 | End: 2025-01-16

## 2025-01-16 RX ORDER — BUPIVACAINE HYDROCHLORIDE 5 MG/ML
4 INJECTION, SOLUTION PERINEURAL ONCE
Status: COMPLETED | OUTPATIENT
Start: 2025-01-16 | End: 2025-01-16

## 2025-01-16 RX ADMIN — BUPIVACAINE HYDROCHLORIDE 20 MG: 5 INJECTION, SOLUTION PERINEURAL at 15:07

## 2025-01-16 RX ADMIN — BETAMETHASONE SODIUM PHOSPHATE AND BETAMETHASONE ACETATE 3 MG: 3; 3 INJECTION, SUSPENSION INTRA-ARTICULAR; INTRALESIONAL; INTRAMUSCULAR; SOFT TISSUE at 15:03

## 2025-01-16 RX ADMIN — BUPIVACAINE HYDROCHLORIDE 20 MG: 5 INJECTION, SOLUTION PERINEURAL at 15:06

## 2025-01-16 RX ADMIN — BETAMETHASONE SODIUM PHOSPHATE AND BETAMETHASONE ACETATE 3 MG: 3; 3 INJECTION, SUSPENSION INTRA-ARTICULAR; INTRALESIONAL; INTRAMUSCULAR; SOFT TISSUE at 15:05

## 2025-02-04 ENCOUNTER — HOSPITAL ENCOUNTER (OUTPATIENT)
Facility: HOSPITAL | Age: 45
Setting detail: RECURRING SERIES
Discharge: HOME | End: 2025-02-07
Payer: COMMERCIAL

## 2025-02-04 PROCEDURE — 97161 PT EVAL LOW COMPLEX 20 MIN: CPT

## 2025-02-04 PROCEDURE — 97110 THERAPEUTIC EXERCISES: CPT

## 2025-02-04 NOTE — PROGRESS NOTES
[facility] [address]  [address]  [place] [address]  [phone] fax    Plan of Care / Statement of Necessity for Physical Therapy Services     Patient Name: Mickie Washington : 1980   Medical   Diagnosis: Pain in left knee [M25.562]  Pain in right knee [M25.561] Treatment Diagnosis: M25.561  RIGHT KNEE PAIN and M25.562  LEFT KNEE PAIN       Onset Date: 25 (referral date)  Payor :  [place] / Plan: [facility] [employer] / Product Type: *No Product type* /    Referral Source: Shade Acharya MD Start of Care (SOC): 2025   Prior Hospitalization: See medical history Provider #: [id number]   Prior Level of Function: Independent with all functional mobility, ADLs, IADLs   Comorbidities: arthritis       Assessment / key information:  Patient is a pleasant 44 year old female with c/o B knee pain, L > R. H/o B knee pain, but fell onto B knees about 1 year ago, resulting in exacerbation of knee pain. Patient ambulates in clinic space with no AD; noted B knee valgus and recurvatum in WB. Patient presents with B ant/lat knee pain L > R, L hip flex and B hip abd/ext weakness, limited AROM L knee flex, decreased flexibility, and impaired functional mobility, which affects her QOL. 5xSTS score slower than age-related norm. Patient provided with HEP printout and performs initial exercises with moderate vc/tc and no adverse effect. Patient education on anatomy of present condition, symptom modulation, activity modification, HEP and importance of compliance, role of PT, and POC. Patient will benefit from skilled PT to address the above deficits and improve functional mobility so that she can perform ADLs, ambulation, and work-related duties with minimal pain or limitation.     Evaluation Complexity:  History:  LOW Complexity : Zero comorbidities / personal factors that will impact the outcome / POC; Examination:  LOW Complexity : 1-2 Standardized tests and

## 2025-02-04 NOTE — PROGRESS NOTES
PT DAILY TREATMENT NOTE/KNEE EVAL       Patient Name: Mickie Washington    Date: 2025    : 1980  Insurance: [place] / Plan: [facility] / Product Type: *No Product type* /      Patient  verified yes     Visit #   Current / Total 1 10   Time   In / Out 7385 0240   Pain   In / Out 4/10 4/10   Subjective Functional Status/Changes: Pt arrives to PT IE with c/o B knee pain.      Treatment Area: Pain in left knee [M25.562]  Pain in right knee [M25.561]  If an interpreting service is utilized for treatment of this patient, the contents of this document represent the material reviewed with the patient via the .     SUBJECTIVE  Any medication changes, allergies to medications, adverse drug reactions, diagnosis change, or new procedure performed?: [x] No    [] Yes (see summary sheet for update)    Subjective Info:  HPI: Patient reports B knee pain, L > R. H/o chronic knee pain. Had a fall about 1 year ago when she hit both her knees and increased B knee pain.   Current Pain: 4/10 Best Pain: 0/10 Worst Pain: 10/10  Constant/Intermittent: Intermittent   Progression since onset: Not getting better  Pain Location/Description: Ant/lat B knees , L > R  Current Symptoms: Burning, crackling/popping; occasional knee buckle, but not often; stiffness    Current Mobility: no AD for amb  Aggravating/Relieving Factors: Aggravating: working on her BLE all day, getting out bed in the morning when the weather is bad, going up > down stairs   Relieving: BC or tylenol arthritis   AMBROSE: Fall on B knees 1 year ago   Previous Treatment: Got injection a few weeks ago, but wore off  Self Care: Independent   Activity/Participation Limitations: Unable to squat, avoids bending knees   PLOF: Independent with all functional mobility, ADLs, IADLs  Home-Environment: Lives with 3 kids; 7 steps & 8 steps inside, 0 SHIKHA  Past Med Hx/Surgical Hx: Arthritis, heel spur in R foot   Allergies: old [place], sulfa antibiotics,

## 2025-02-10 ENCOUNTER — HOSPITAL ENCOUNTER (OUTPATIENT)
Facility: HOSPITAL | Age: 45
Setting detail: RECURRING SERIES
Discharge: HOME | End: 2025-02-13
Payer: COMMERCIAL

## 2025-02-10 PROCEDURE — 97530 THERAPEUTIC ACTIVITIES: CPT

## 2025-02-10 PROCEDURE — 97110 THERAPEUTIC EXERCISES: CPT

## 2025-02-10 PROCEDURE — 97112 NEUROMUSCULAR REEDUCATION: CPT

## 2025-02-10 NOTE — PROGRESS NOTES
PHYSICAL / OCCUPATIONAL THERAPY - DAILY TREATMENT NOTE    Patient Name: Mickie Washington    Date: 2/10/2025    : 1980  Insurance: [place] / Plan: [facility] BCBS / Product Type: *No Product type* /      Patient  verified Yes     Visit #   Current / Total 2 10   Time   In / Out 250 329   Pain   In / Out 4 4   Subjective Functional Status/Changes: Pt reported stiffness B knees R>L     TREATMENT AREA =  Pain in left knee [M25.562]  Pain in right knee [M25.561]     OBJECTIVE         Therapeutic Procedures:    Tx Min Billable or 1:1 Min (if diff from Tx Min) Procedure, Rationale, Specifics   15 15 06884 Therapeutic Exercise (timed):  increase ROM, strength, coordination, balance, and proprioception to improve patient's ability to progress to PLOF and address remaining functional goals. (see flow sheet as applicable)     Details if applicable:       15 15 16808 Neuromuscular Re-Education (timed):  improve balance, coordination, kinesthetic sense, posture, core stability and proprioception to improve patient's ability to develop conscious control of individual muscles and awareness of position of extremities in order to progress to PLOF and address remaining functional goals. (see flow sheet as applicable)     Details if applicable:     97530 Therapeutic Activity (timed):  use of dynamic activities replicating functional movements to increase ROM, strength, coordination, balance, and proprioception in order to improve patient's ability to progress to PLOF and address remaining functional goals.  (see flow sheet as applicable)     Details if applicable:            Details if applicable:            Details if applicable:     39 39 [facility] Totals Reminder: bill using total billable min of TIMED therapeutic procedures (example: do not include dry needle or estim unattended, both untimed codes, in totals to left)  8-22 min = 1 unit; 23-37 min = 2 units; 38-52 min = 3 units; 53-67 min = 4 units; 68-82 min = 5

## 2025-02-13 ENCOUNTER — HOSPITAL ENCOUNTER (OUTPATIENT)
Facility: HOSPITAL | Age: 45
Setting detail: RECURRING SERIES
Discharge: HOME | End: 2025-02-16
Payer: COMMERCIAL

## 2025-02-13 PROCEDURE — 97110 THERAPEUTIC EXERCISES: CPT

## 2025-02-13 PROCEDURE — 97530 THERAPEUTIC ACTIVITIES: CPT

## 2025-02-13 PROCEDURE — 97112 NEUROMUSCULAR REEDUCATION: CPT

## 2025-02-13 NOTE — PROGRESS NOTES
PHYSICAL / OCCUPATIONAL THERAPY - DAILY TREATMENT NOTE    Patient Name: Mickie Washington    Date: 2025    : 1980  Insurance: [place] / Plan: [facility] BCBS / Product Type: *No Product type* /      Patient  verified Yes     Visit #   Current / Total 3 10   Time   In / Out 330 410   Pain   In / Out 7 4   Subjective Functional Status/Changes: Pt reports B Knee pain due to the cold weather.     TREATMENT AREA =  Pain in left knee [M25.562]  Pain in right knee [M25.561]     OBJECTIVE         Therapeutic Procedures:    Tx Min Billable or 1:1 Min (if diff from Tx Min) Procedure, Rationale, Specifics   15 15 39759 Therapeutic Exercise (timed):  increase ROM, strength, coordination, balance, and proprioception to improve patient's ability to progress to PLOF and address remaining functional goals. (see flow sheet as applicable)     Details if applicable:       14 14 05388 Neuromuscular Re-Education (timed):  improve balance, coordination, kinesthetic sense, posture, core stability and proprioception to improve patient's ability to develop conscious control of individual muscles and awareness of position of extremities in order to progress to PLOF and address remaining functional goals. (see flow sheet as applicable)     Details if applicable:     9  83508 Therapeutic Activity (timed):  use of dynamic activities replicating functional movements to increase ROM, strength, coordination, balance, and proprioception in order to improve patient's ability to progress to PLOF and address remaining functional goals.  (see flow sheet as applicable)     Details if applicable:            Details if applicable:            Details if applicable:     38 38 [facility] Totals Reminder: bill using total billable min of TIMED therapeutic procedures (example: do not include dry needle or estim unattended, both untimed codes, in totals to left)  8-22 min = 1 unit; 23-37 min = 2 units; 38-52 min = 3 units; 53-67 min = 4 units;

## 2025-02-17 ENCOUNTER — HOSPITAL ENCOUNTER (OUTPATIENT)
Facility: HOSPITAL | Age: 45
Setting detail: RECURRING SERIES
Discharge: HOME | End: 2025-02-20
Payer: COMMERCIAL

## 2025-02-17 ENCOUNTER — TRANSCRIBE ORDERS (OUTPATIENT)
Facility: HOSPITAL | Age: 45
End: 2025-02-17

## 2025-02-17 DIAGNOSIS — Z12.31 OTHER SCREENING MAMMOGRAM: Primary | ICD-10-CM

## 2025-02-17 PROCEDURE — 97112 NEUROMUSCULAR REEDUCATION: CPT

## 2025-02-17 PROCEDURE — 97110 THERAPEUTIC EXERCISES: CPT

## 2025-02-17 PROCEDURE — 97530 THERAPEUTIC ACTIVITIES: CPT

## 2025-02-17 NOTE — PROGRESS NOTES
PHYSICAL / OCCUPATIONAL THERAPY - DAILY TREATMENT NOTE    Patient Name: Mickie Washington    Date: 2025    : 1980  Insurance: [place] / Plan: [facility] BCBS / Product Type: *No Product type* /      Patient  verified Yes     Visit #   Current / Total 4 10   Time   In / Out 330 410   Pain   In / Out 9 3   Subjective Functional Status/Changes: Pt reports feeling less stiff     TREATMENT AREA =  Pain in left knee [M25.562]  Pain in right knee [M25.561]     OBJECTIVE         Therapeutic Procedures:    Tx Min Billable or 1:1 Min (if diff from Tx Min) Procedure, Rationale, Specifics   15 15 88733 Therapeutic Exercise (timed):  increase ROM, strength, coordination, balance, and proprioception to improve patient's ability to progress to PLOF and address remaining functional goals. (see flow sheet as applicable)     Details if applicable:        97586 Neuromuscular Re-Education (timed):  improve balance, coordination, kinesthetic sense, posture, core stability and proprioception to improve patient's ability to develop conscious control of individual muscles and awareness of position of extremities in order to progress to PLOF and address remaining functional goals. (see flow sheet as applicable)     Details if applicable:      34699 Therapeutic Activity (timed):  use of dynamic activities replicating functional movements to increase ROM, strength, coordination, balance, and proprioception in order to improve patient's ability to progress to PLOF and address remaining functional goals.  (see flow sheet as applicable)     Details if applicable:            Details if applicable:            Details if applicable:     40 40 [facility] Totals Reminder: bill using total billable min of TIMED therapeutic procedures (example: do not include dry needle or estim unattended, both untimed codes, in totals to left)  8-22 min = 1 unit; 23-37 min = 2 units; 38-52 min = 3 units; 53-67 min = 4 units; 68-82 min = 5

## 2025-02-20 ENCOUNTER — APPOINTMENT (OUTPATIENT)
Facility: HOSPITAL | Age: 45
End: 2025-02-20
Payer: COMMERCIAL

## 2025-02-24 ENCOUNTER — HOSPITAL ENCOUNTER (OUTPATIENT)
Facility: HOSPITAL | Age: 45
Setting detail: RECURRING SERIES
Discharge: HOME | End: 2025-02-27
Payer: COMMERCIAL

## 2025-02-24 PROCEDURE — 97530 THERAPEUTIC ACTIVITIES: CPT

## 2025-02-24 PROCEDURE — 97110 THERAPEUTIC EXERCISES: CPT

## 2025-02-24 PROCEDURE — 97112 NEUROMUSCULAR REEDUCATION: CPT

## 2025-02-24 NOTE — PROGRESS NOTES
PHYSICAL / OCCUPATIONAL THERAPY - DAILY TREATMENT NOTE    Patient Name: Mickie Washington    Date: 2025    : 1980  Ins[place]: [place] / Plan: [facility] BCBS / Product Type: *No Product type* /      Patient  verified Yes     Visit #   Current / Total 5 10   Time   In / Out 450 530   Pain   In / Out 4 0   Subjective Functional Status/Changes: Patient reports she has some pain in her knees but it's not that bad today.     TREATMENT AREA =  Pain in left knee [M25.562]  Pain in right knee [M25.561]     OBJECTIVE    Therapeutic Procedures:    Tx Min Billable or 1:1 Min (if diff from Tx Min) Procedure, Rationale, Specifics   10  25696 Therapeutic Exercise (timed):  increase ROM, strength, coordination, balance, and proprioception to improve patient's ability to progress to PLOF and address remaining functional goals. (see flow sheet as applicable)     Details if applicable:       10  61919 Neuromuscular Re-Education (timed):  improve balance, coordination, kinesthetic sense, posture, core stability and proprioception to improve patient's ability to develop conscious control of individual muscles and awareness of position of extremities in order to progress to PLOF and address remaining functional goals. (see flow sheet as applicable)     Details if applicable:  quad/glut re-ed   20  93724 Therapeutic Activity (timed):  use of dynamic activities replicating functional movements to increase ROM, strength, coordination, balance, and proprioception in order to improve patient's ability to progress to PLOF and address remaining functional goals.  (see flow sheet as applicable)     Details if applicable:  standing functional hip strength   40  [facility] Totals Reminder: bill using total billable min of TIMED therapeutic procedures (example: do not include dry needle or estim unattended, both untimed codes, in totals to left)  8-22 min = 1 unit; 23-37 min = 2 units; 38-52 min = 3 units; 53-67 min = 4 units; 68-82

## 2025-02-27 ENCOUNTER — APPOINTMENT (OUTPATIENT)
Facility: HOSPITAL | Age: 45
End: 2025-02-27
Payer: COMMERCIAL

## 2025-03-10 ENCOUNTER — OFFICE VISIT (OUTPATIENT)
Age: 45
End: 2025-03-10

## 2025-03-10 DIAGNOSIS — M17.12 UNILATERAL PRIMARY OSTEOARTHRITIS, LEFT KNEE: Primary | ICD-10-CM

## 2025-03-10 DIAGNOSIS — M17.11 UNILATERAL PRIMARY OSTEOARTHRITIS, RIGHT KNEE: ICD-10-CM

## 2025-03-10 RX ORDER — HYALURONATE SODIUM 10 MG/ML
20 SYRINGE (ML) INTRAARTICULAR ONCE
Status: COMPLETED | OUTPATIENT
Start: 2025-03-10 | End: 2025-03-10

## 2025-03-10 RX ADMIN — Medication 20 MG: at 15:41

## 2025-03-10 RX ADMIN — Medication 20 MG: at 15:39

## 2025-03-10 NOTE — PROGRESS NOTES
Patient: Mickie Washington                MRN: [id number]       SSN: [id number]  YOB: 1980        AGE: 44 y.o.        SEX: female  There is no height or weight on file to calculate BMI.    PCP: Cecilio Gagnon MD  03/10/25    Chief Complaint   Patient presents with    Knee Pain     bilateral       HISTORY:  Mickie Washington is a 44 y.o. female who is seen for bilateral knee pain.    ICD-10-CM    1. Unilateral primary osteoarthritis, left knee  M17.12 DRAIN/INJECT LARGE JOINT/BURSA     sodium hyaluronate (EUFLEXXA, HYALGAN) injection 20 mg     sodium hyaluronate (EUFLEXXA, HYALGAN) injection 20 mg      2. Unilateral primary osteoarthritis, right knee  M17.11 DRAIN/INJECT LARGE JOINT/BURSA     sodium hyaluronate (EUFLEXXA, HYALGAN) injection 20 mg     sodium hyaluronate (EUFLEXXA, HYALGAN) injection 20 mg          PROCEDURE:  Ms. Washington's knees injected with 2 cc of Euflexxa.     Chart reviewed for the following:   Shade ZIEGLER MD, have reviewed the History, Physical and updated the Allergic reactions for Mickie Washington     TIME OUT performed immediately prior to start of procedure:  Shade ZIEGLER MD, have performed the following reviews on Mickie Washington prior to the start of the procedure:            * Patient was identified by name and date of birth   * Agreement on procedure being performed was verified  * Risks and Benefits explained to the patient  * Procedure site verified and marked as necessary  * Patient was positioned for comfort  * Consent was obtained     Time: 3:38 PM     Date of procedure: 3/10/2025    Procedure performed by:  Shade Acharya MD    Ms. Washington tolerated the procedure well with no complications.      PLAN:  Ms. Washington will follow up in one week to continue her visco supplementation injection series.

## 2025-03-14 NOTE — PROGRESS NOTES
Patient: Mickie Washington                MRN: [id number]       SSN: [id number]  YOB: 1980        AGE: 44 y.o.        SEX: female  There is no height or weight on file to calculate BMI.    PCP: Cecilio Gagnon MD  03/17/25    Chief Complaint   Patient presents with    Knee Pain     bilateral     HISTORY:  Mickie Washington is a 44 y.o. female who is seen for bilateral knee pain. She presents today for her second injection in the Euflexxa visco supplementation series.      ICD-10-CM    1. Unilateral primary osteoarthritis, left knee  M17.12 DRAIN/INJECT LARGE JOINT/BURSA     sodium hyaluronate (EUFLEXXA, HYALGAN) injection 20 mg      2. Unilateral primary osteoarthritis, right knee  M17.11 DRAIN/INJECT LARGE JOINT/BURSA     sodium hyaluronate (EUFLEXXA, HYALGAN) injection 20 mg             PROCEDURE:  Ms. Washington's knees injected with 2 cc of Euflexxa.     Chart reviewed for the following:   Shade ZIEGLER MD, have reviewed the History, Physical and updated the Allergic reactions for Mickie Washington     TIME OUT performed immediately prior to start of procedure:  Shade ZIEGLER MD, have performed the following reviews on Mickie Washington prior to the start of the procedure:            * Patient was identified by name and date of birth   * Agreement on procedure being performed was verified  * Risks and Benefits explained to the patient  * Procedure site verified and marked as necessary  * Patient was positioned for comfort  * Consent was obtained     Time: 3:27 PM     Date of procedure: 3/17/2025    Procedure performed by:  Shade Acharya MD    Ms. Washington tolerated the procedure well with no complications.    PLAN: Ms. Timmons knees injected with 2 cc of Euflexxa. Ms. Washington will follow up in one week to complete her visco supplementation injection series.    Documentation by teresa Smith, as documented by Shade Acharya MD.

## 2025-03-17 ENCOUNTER — OFFICE VISIT (OUTPATIENT)
Age: 45
End: 2025-03-17

## 2025-03-17 DIAGNOSIS — M17.11 UNILATERAL PRIMARY OSTEOARTHRITIS, RIGHT KNEE: ICD-10-CM

## 2025-03-17 DIAGNOSIS — M17.12 UNILATERAL PRIMARY OSTEOARTHRITIS, LEFT KNEE: Primary | ICD-10-CM

## 2025-03-21 NOTE — PROGRESS NOTES
Patient: Mickie Washington                MRN: [id number]       SSN: [id number]  YOB: 1980        AGE: 44 y.o.        SEX: female  Body mass index is 44.3 kg/m².    PCP: Cecilio Gagnon MD  03/24/25    Chief Complaint   Patient presents with    Injections     Bilateral knee Euflexxa #3     HISTORY:  Mickie Washington is a 44 y.o. female who is seen for bilateral knee pain. She presents today for her third injection in the Euflexxa visco supplementation series.    PROCEDURE:  Ms. Timmons knees injected with 2 cc of Euflexxa.     TIME OUT performed immediately prior to start of procedure:  I, Shade Acharya MD, have performed the following reviews on Mickie Washington prior to the start of the procedure:            * Patient was identified by name and date of birth   * Agreement on procedure being performed was verified  * Risks and Benefits explained to the patient  * Procedure site verified and marked as necessary  * Patient was positioned for comfort  * Consent was obtained     Time: 3:27 PM     Date of procedure: 3/24/2025    Procedure performed by:  Shade Acharya MD    Ms. Washington tolerated the procedure well with no complications      ICD-10-CM    1. Unilateral primary osteoarthritis, left knee  M17.12 DRAIN/INJECT LARGE JOINT/BURSA     sodium hyaluronate (EUFLEXXA, HYALGAN) injection 20 mg      2. Unilateral primary osteoarthritis, right knee  M17.11 DRAIN/INJECT LARGE JOINT/BURSA     sodium hyaluronate (EUFLEXXA, HYALGAN) injection 20 mg        PLAN: Ms. Washington's knees injected with 2 cc of Euflexxa. Ms. Washington will follow up PRN now that she has completed her visco supplementation injection series.     Documentation by teresa Smith, as documented by Shade Acharya MD.

## 2025-03-24 ENCOUNTER — OFFICE VISIT (OUTPATIENT)
Age: 45
End: 2025-03-24
Payer: COMMERCIAL

## 2025-03-24 VITALS — HEIGHT: 69 IN | BODY MASS INDEX: 44.3 KG/M2

## 2025-03-24 DIAGNOSIS — M17.12 UNILATERAL PRIMARY OSTEOARTHRITIS, LEFT KNEE: Primary | ICD-10-CM

## 2025-03-24 DIAGNOSIS — M17.11 UNILATERAL PRIMARY OSTEOARTHRITIS, RIGHT KNEE: ICD-10-CM

## 2025-03-24 PROCEDURE — 20610 DRAIN/INJ JOINT/BURSA W/O US: CPT | Performed by: SPECIALIST

## 2025-04-04 ENCOUNTER — HOSPITAL ENCOUNTER (OUTPATIENT)
Facility: HOSPITAL | Age: 45
Discharge: HOME | End: 2025-04-04
Payer: COMMERCIAL

## 2025-04-04 VITALS — WEIGHT: 293 LBS | BODY MASS INDEX: 43.4 KG/M2 | HEIGHT: 69 IN

## 2025-04-04 DIAGNOSIS — Z12.31 OTHER SCREENING MAMMOGRAM: ICD-10-CM

## 2025-04-04 PROCEDURE — 77063 BREAST TOMOSYNTHESIS BI: CPT
